# Patient Record
Sex: FEMALE | Race: WHITE | NOT HISPANIC OR LATINO | Employment: UNEMPLOYED | URBAN - METROPOLITAN AREA
[De-identification: names, ages, dates, MRNs, and addresses within clinical notes are randomized per-mention and may not be internally consistent; named-entity substitution may affect disease eponyms.]

---

## 2018-06-01 ENCOUNTER — TRANSFERRED RECORDS (OUTPATIENT)
Dept: PHYSICAL THERAPY | Facility: CLINIC | Age: 58
End: 2018-06-01

## 2018-06-12 ENCOUNTER — TRANSFERRED RECORDS (OUTPATIENT)
Dept: PHYSICAL THERAPY | Facility: CLINIC | Age: 58
End: 2018-06-12

## 2019-11-22 ENCOUNTER — TRANSFERRED RECORDS (OUTPATIENT)
Dept: PHYSICAL THERAPY | Facility: CLINIC | Age: 59
End: 2019-11-22

## 2021-02-15 ENCOUNTER — HOSPITAL ENCOUNTER (OUTPATIENT)
Dept: PHYSICAL THERAPY | Facility: CLINIC | Age: 61
Setting detail: THERAPIES SERIES
End: 2021-02-15
Attending: FAMILY MEDICINE
Payer: COMMERCIAL

## 2021-02-15 PROCEDURE — 97110 THERAPEUTIC EXERCISES: CPT | Mod: GP | Performed by: PHYSICAL THERAPIST

## 2021-02-15 PROCEDURE — 97161 PT EVAL LOW COMPLEX 20 MIN: CPT | Mod: GP | Performed by: PHYSICAL THERAPIST

## 2021-02-15 NOTE — PROGRESS NOTES
02/15/21 1000   Signing Clinician's Name / Credentials   Signing clinician's name / credentials Marlin Almanza, DPT   Quintero Balance Scale (QUINTERO LISA, ANUPAM S, GINO PHAN, BAUDILIO SYKES: MEASURING BALANCE IN THE ELDERLY: VALIDATION OF AN INSTRUMENT. CAN. J. PUB. HEALTH, JULY/AUGUST SUPPLEMENT 2:S7-11, 1992.)   Sit To Stand 4   Standing Unsupported 4   Sitting Unsupported 4   Stand to Sit 4   Transfers 4   Standing with Eyes Closed 4   Standing Unsupported, Feet Together 4   Reach Forward With Outstretched Arm 2   Retrieve Object From Floor 3  (Very wide RAJESH)   Turning to Look Behind 4   Turn 360 Degrees 2   Placing Alternate Foot on Stool (4-6 inches) 3   Unsupported Tandem Stand (Demonstrate to Subject) 2   One Leg Stand 2  (4 sec)   Total Score (A score of 45 or less has been correlated with an increased risk of falls)   Total Score (out of 56) 46     Quintero Balance Scale (BBS) Cutoff Scores: A score of < 45/56 indicates an increased risk for falls.   Patient Score: 46/56    The BBS is a measure of static and dynamic standing balance that has been validated in community dwelling elderly individuals and individuals who have Parkinson's Disease, MS, and those who are s/p CVA and TBI. The test is administered without an assistive device. Scores from the Qiuntero are used to determine the probability of falling based on the patient's previous history of falls and their test performance.     Minimal Detectable Change = 6.5   & Minimal Detectable Change (Parkinson's Disease) = 5 according to Lenny & Garrettey 2008    Assessment (rationale for performing, application to patient s function & care plan): Assess risk for falls, no increased risk per the Quintero but increased per the TUG and the 30 sec STS.   Minutes billed as physical performance test: 0

## 2021-02-15 NOTE — PROGRESS NOTES
Rehabilitation Services        OUTPATIENT PHYSICAL THERAPY FUNCTIONAL EVALUATION  PLAN OF TREATMENT FOR OUTPATIENT REHABILITATION  (COMPLETE FOR INITIAL CLAIMS ONLY)  Patient's Last Name, First Name, M.I.  YOB: 1960  NaderYamileth  ZAN     Provider's Name   Marlin Almanza PT   Medical Record No.  [id number]     Start of Care Date:  02/15/21   Onset Date:  01/18/21(Referral date used)   Type:     _X__PT   ____OT  ____SLP Medical Diagnosis:  Balance problems R26.89     PT Diagnosis:  Impaired balance and functional mobility Visits from SOC:  1                              __________________________________________________________________________________  Plan of Treatment/Functional Goals:  balance training, bed mobility training, gait training, neuromuscular re-education, orthotic fitting/training, ROM, strengthening, transfer training, stretching(Canalith repositioning maneuvers)           GOALS  HEP  Patient will demonstrate understanding and compliance to her HEP for continued wellbeing upon discharge from skilled physical therapy.  04/12/21    30 sec STS  Patient will complete 10 STS transfers with no UE assist from an 18 inch surface in 30 seconds to demonstrate improved LE strength for independent transfers from low surfaces.  04/12/21    Naranjo  Patient will complete the Naranjo with a score of >50/56 to demonstrate improved balance and decreased risk for falls with daily activities.  04/12/21    TUG  Patient will complete the TUG with and without dual physical challenge of carrying a half full glass of water in less than 13.5 seconds to demonstrate improved safety and independence with transfers, turns, and gait.  04/12/21                                                Therapy Frequency:  2 times/Week(Decreasing in frequency as indicated)   Predicted Duration of Therapy Intervention:  8 weeks    Marlin Almanza, PT,  DPT                                    I CERTIFY THE NEED FOR THESE SERVICES FURNISHED UNDER        THIS PLAN OF TREATMENT AND WHILE UNDER MY CARE .             Physician Signature               Date    X_____________________________________________________                      Certification Date From:  02/15/21   Certification Date To:  04/12/21    Referring Provider:  Janell Vines MD    Initial Assessment  See Epic Evaluation- Start of Care Date: 02/15/21

## 2021-02-15 NOTE — PROGRESS NOTES
02/15/21 1000   Quick Adds   Quick Adds Certification   Type of Visit Initial OP PT Evaluation   General Information   Start of Care Date 02/15/21   Referring Physician Janell Vines MD   Orders Evaluate and Treat as Indicated   Order Date 01/18/21   Medical Diagnosis Balance problems R26.89   Onset of illness/injury or Date of Surgery 01/18/21  (Referral date used)   Precautions/Limitations fall precautions   Surgical/Medical history reviewed Yes   Pertinent history of current problem (include personal factors and/or comorbidities that impact the POC) Patient has a PMH had reverse total right shoulder surgery in 8/2020, HTN, memory changes, falls, and leukopenia. Patient reports that she has had trouble with balance over the last year, but recently is having more falls. She is fallling 1-2x per week, sometimes with coming in from taking the dog out - slippery, she is also tripping over her own feet. She wears orthotics and needs some new ones, feels her right side is weaker, right hand dominant. Patient notes that she has dizziness after a fall, has to wait a couple of seconds for it to resolve before she can stand. Patient endorses dizziness with getting out of bed, has to sit for a couple of minutes to collect herself, feels like her right side is dragging. Patient endorses some feelings of room spinning sensation, nothing major.    Pertinent Visual History  Glasses   Prior level of function comment Patient was previously IND with all ADLs and functional mobility. Patient notes she has to be very cautious with walking, has to look at her feet with walking. 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 Yes   Have you fallen and had an injury in the past year? No   Timed Up and Go score (seconds) 13.97   Is patient a fall risk? Yes;Department fall risk interventions implemented   Pain   Patient currently in pain Yes   Pain location R shoulder   Pain rating 7/10   Pain description Sharp   Pain comments Worse with dependent positioning for a long time   Cognitive Status Examination   Cognitive Comment Recommend speech therapy evaluation   Integumentary   Integumentary No deficits were identified   Posture   Posture Forward head position   Strength   Strength Comments MMT performed in sitting, B LE grossly 4-/5   Bed Mobility   Bed Mobility Comments Notes onset of dizziness with getting out of bed   Transfer Skills   Transfer Comments Able to stand without UE assist but with left lateral lean    Gait   Gait Comments Patient ambulates with decreased gait speed, intermittently narrow and wide RAJESH with mild-moderate path deviation, occasional L LE scissoring, excessive left toe-in vs hip internal rotation, [place] gait in right stance, decreased right arm swing, downward gaze throughout   Balance Special Tests   Balance Special Tests Timed up and go;Sit to stand reps;Romberg;Naranjo balance   Balance Special Tests Timed Up and Go   Seconds 13.97 Seconds   Comments Cognitive serial 7s: 20.87 sec, Physical half cup of water: 17.53 sec, no AD throughout   Balance Special Tests Naranjo Balance   Score out of 56 46   Comments Impaired balance but no increased risk for falls   Balance Special Tests Romberg   Seconds 30 Seconds   Comments Eyes closed: 30 sec   Balance Special Tests Sit to Stand Reps in 30 Seconds   Reps in 30 seconds 6   Height 18 inches   Comments Impaired functional  LE strength, increased risk for falls, left lateral lean and poor right LE control with standing   Sensory Examination   Sensory Perception no deficits were identified   Coordination   Coordination no deficits were identified   Muscle Tone   Muscle Tone no deficits were identified   Modality Interventions   Planned Modality Interventions Comments Per therapist discretion   Planned Therapy Interventions   Planned Therapy Interventions balance training;bed mobility training;gait training;neuromuscular re-education;orthotic fitting/training;ROM;strengthening;transfer training;stretching  (Canalith repositioning maneuvers)   Clinical Impression   Criteria for Skilled Therapeutic Interventions Met yes, treatment indicated   PT Diagnosis Impaired balance and functional mobility   Influenced by the following impairments Impaired balance, dizziness, impaired cognition and memory, impaired strength, gait deviations, history of falls   Functional limitations due to impairments Increased risk for falls per the 30 sec STS and TUG, impaired safety and independence with transfers, bed mobility, ADLs, and functional mobility   Clinical Presentation Evolving/Changing   Clinical Presentation Rationale Dizziness, falls 1-2x per week   Clinical Decision Making (Complexity) Moderate complexity   Therapy Frequency 2 times/Week  (Decreasing in frequency as indicated)   Predicted Duration of Therapy Intervention (days/wks) 8 weeks   Risk & Benefits of therapy have been explained Yes   Patient, Family & other staff in agreement with plan of care Yes   Clinical Impression Comments Patient is a 60 year old female presenting to physical therapy with balance impairments and a history of falls. Patient is at an increased risk for falls per the 30 sec STS, Naranjo, and TUG with cognitive impairment noted. Patient requires frequent redirection and cues for attention to task during the session, also notes memory impairment and may benefit from a  speech therapy referral. Patient may benefit from skilled physical therapy to decrease her risk for falls and increase her safety and independence with functional mobility and ADLs.    GOALS   PT Eval Goals 1;2;3;4   Goal 1   Goal Identifier HEP   Goal Description Patient will demonstrate understanding and compliance to her HEP for continued wellbeing upon discharge from skilled physical therapy.   Target Date 04/12/21   Goal 2   Goal Identifier 30 sec STS   Goal Description Patient will complete 10 STS transfers with no UE assist from an 18 inch surface in 30 seconds to demonstrate improved LE strength for independent transfers from low surfaces.   Target Date 04/12/21   Goal 3   Goal Identifier Naranjo   Goal Description Patient will complete the Naranjo with a score of >50/56 to demonstrate improved balance and decreased risk for falls with daily activities.   Target Date 04/12/21   Goal 4   Goal Identifier TUG   Goal Description Patient will complete the TUG with and without dual physical challenge of carrying a half full glass of water in less than 13.5 seconds to demonstrate improved safety and independence with transfers, turns, and gait.   Target Date 04/12/21   Total Evaluation Time   PT Eval, Low Complexity Minutes (15842) 40   Therapy Certification   Certification date from 02/15/21   Certification date to 04/12/21   Medical Diagnosis Balance problems R26.89   Certification I certify the need for these services furnished under this plan of treatment and while under my care.  (Physician co-signature of this document indicates review and certification of the therapy plan).

## 2021-02-16 ENCOUNTER — MEDICAL CORRESPONDENCE (OUTPATIENT)
Dept: HEALTH INFORMATION MANAGEMENT | Facility: CLINIC | Age: 61
End: 2021-02-16

## 2021-03-02 ENCOUNTER — HOSPITAL ENCOUNTER (OUTPATIENT)
Dept: PHYSICAL THERAPY | Facility: CLINIC | Age: 61
Setting detail: THERAPIES SERIES
End: 2021-03-02
Attending: FAMILY MEDICINE
Payer: COMMERCIAL

## 2021-03-02 PROCEDURE — 97112 NEUROMUSCULAR REEDUCATION: CPT | Mod: GP | Performed by: PHYSICAL THERAPIST

## 2021-03-02 PROCEDURE — 97110 THERAPEUTIC EXERCISES: CPT | Mod: GP | Performed by: PHYSICAL THERAPIST

## 2021-03-04 ENCOUNTER — HOSPITAL ENCOUNTER (OUTPATIENT)
Dept: PHYSICAL THERAPY | Facility: CLINIC | Age: 61
Setting detail: THERAPIES SERIES
End: 2021-03-04
Attending: FAMILY MEDICINE
Payer: COMMERCIAL

## 2021-03-04 PROCEDURE — 97116 GAIT TRAINING THERAPY: CPT | Mod: GP | Performed by: PHYSICAL THERAPIST

## 2021-03-04 PROCEDURE — 97110 THERAPEUTIC EXERCISES: CPT | Mod: GP | Performed by: PHYSICAL THERAPIST

## 2021-03-08 ENCOUNTER — HOSPITAL ENCOUNTER (OUTPATIENT)
Dept: PHYSICAL THERAPY | Facility: CLINIC | Age: 61
Setting detail: THERAPIES SERIES
End: 2021-03-08
Attending: FAMILY MEDICINE
Payer: COMMERCIAL

## 2021-03-08 PROCEDURE — 97530 THERAPEUTIC ACTIVITIES: CPT | Mod: GP | Performed by: PHYSICAL THERAPIST

## 2021-03-08 PROCEDURE — 97112 NEUROMUSCULAR REEDUCATION: CPT | Mod: GP | Performed by: PHYSICAL THERAPIST

## 2021-03-08 PROCEDURE — 97116 GAIT TRAINING THERAPY: CPT | Mod: GP | Performed by: PHYSICAL THERAPIST

## 2021-03-12 ENCOUNTER — HOSPITAL ENCOUNTER (OUTPATIENT)
Dept: PHYSICAL THERAPY | Facility: CLINIC | Age: 61
Setting detail: THERAPIES SERIES
End: 2021-03-12
Attending: FAMILY MEDICINE
Payer: COMMERCIAL

## 2021-03-12 PROCEDURE — 97112 NEUROMUSCULAR REEDUCATION: CPT | Mod: GP | Performed by: PHYSICAL THERAPIST

## 2021-03-12 PROCEDURE — 97110 THERAPEUTIC EXERCISES: CPT | Mod: GP | Performed by: PHYSICAL THERAPIST

## 2021-03-15 ENCOUNTER — HOSPITAL ENCOUNTER (OUTPATIENT)
Dept: PHYSICAL THERAPY | Facility: CLINIC | Age: 61
Setting detail: THERAPIES SERIES
End: 2021-03-15
Attending: FAMILY MEDICINE
Payer: COMMERCIAL

## 2021-03-15 PROCEDURE — 97110 THERAPEUTIC EXERCISES: CPT | Mod: GP | Performed by: PHYSICAL THERAPIST

## 2021-03-15 PROCEDURE — 97112 NEUROMUSCULAR REEDUCATION: CPT | Mod: GP | Performed by: PHYSICAL THERAPIST

## 2021-03-15 NOTE — IP AVS SNAPSHOT
MRN:[id number]                      After Visit Summary   3/15/2021    Yamileth Doe    MRN: [id number]           Visit Information        Provider Department      3/15/2021 10:45 AM Marlin Almanza, PT [facility]        Your next 10 appointments already scheduled    Mar 22, 2021 10:15 AM  Neuro Treatment with Marlin Almanza PT  [facility] ([facility] ) [address] [facility] [address]  [phone]      Mar 26, 2021  8:15 AM  Neuro Eval with JESS Hernandez  [facility] ([facility] ) [address] [facility] [address]  [phone]      Mar 29, 2021 11:15 AM  Neuro Treatment with Marlin Almanza PT  [facility] ([facility] ) [address] [facility] [address]  [phone]      Apr 09, 2021  8:00 AM  Neuro Treatment with Marlin Almanza PT  [facility] ([facility] ) [address] [facility] [address]  [phone]      Apr 09, 2021  8:45 AM  Neuro Treatment with JESS Hernandez  [facility] ([facility] ) [address] [facility] [address]  [phone]      Apr 16, 2021  8:00 AM  Neuro Treatment with Marlin Almanza PT  [facility] ([facility] ) [address] [facility] [address]  [phone]      Apr 16, 2021  8:45 AM  Neuro Treatment with JESS Hernandez  [facility] ([facility] ) [address] [facility] [address]  [phone]      Apr 23, 2021  8:00 AM  Neuro Treatment with Marlin Almanza, CASANDRA  [facility] ([facility] )  [address] [facility] [address]  [phone]      Apr 23, 2021  8:45 AM  Neuro Treatment with Cheri White, SLP  [facility] ([facility] ) [address] [facility] [address]  [phone]           Further instructions from your care team       For the non slip tape strips:     Clean the steps and the bottom of the door frame with rubbing alcohol and allow to dry. On a day above 50 degrees, peel the backing off the strip and place on the floor or step. Press in the middle of the strip and work your way out to the side applying pressure all the way to the ends. Make sure edges are secure.     Care EveryWhere ID    This is your Care EveryWhere ID. This could be used by other organizations to access your [facility] medical records  [id number]       Equal Access to Services    DOM GARCIA : Hadii rachelle Oneal, waaxda luqadaha, qaybta kaalmada ademarianna, jordi santana . So [facility] [phone].    ATENCIÓN: Si habla español, tiene a keane disposición servicios gratuitos de asistencia lingüística. Alannah al [phone].    We comply with applicable federal and state civil rights laws, including the [place] Human Rights Act. We do not discriminate on the basis of race, color, creed, Latter day, national origin, marital status, age, disability, sex, sexual orientation, or gender identity.    If you would like an itemization of your charges they will now be available in [employer] 30 days after discharge. To access the itemized statements in [employer] go to billing/billing summary. From there select view account. There will be multiple tabs showing an overview of your account, detail, payments, and communications. From the communications tab you can see your monthly statements, your itemized statements, and any billing letters generated for your account. If you do not have a [employer] account and need help getting  access please contact [email or web address] at [phone].  If you would prefer to have your itemized statements mailed please contact our automated itemized bill request line at [phone] option  2.

## 2021-03-15 NOTE — DISCHARGE INSTRUCTIONS
For the non slip tape strips:     Clean the steps and the bottom of the door frame with rubbing alcohol and allow to dry. On a day above 50 degrees, peel the backing off the strip and place on the floor or step. Press in the middle of the strip and work your way out to the side applying pressure all the way to the ends. Make sure edges are secure.

## 2021-03-20 ENCOUNTER — MEDICAL CORRESPONDENCE (OUTPATIENT)
Dept: HEALTH INFORMATION MANAGEMENT | Facility: CLINIC | Age: 61
End: 2021-03-20

## 2021-03-20 ENCOUNTER — TRANSFERRED RECORDS (OUTPATIENT)
Dept: HEALTH INFORMATION MANAGEMENT | Facility: CLINIC | Age: 61
End: 2021-03-20

## 2021-03-26 ENCOUNTER — HOSPITAL ENCOUNTER (OUTPATIENT)
Dept: SPEECH THERAPY | Facility: CLINIC | Age: 61
Setting detail: THERAPIES SERIES
End: 2021-03-26
Attending: FAMILY MEDICINE
Payer: COMMERCIAL

## 2021-03-26 ENCOUNTER — MEDICAL CORRESPONDENCE (OUTPATIENT)
Dept: HEALTH INFORMATION MANAGEMENT | Facility: CLINIC | Age: 61
End: 2021-03-26

## 2021-03-26 PROCEDURE — 96125 COGNITIVE TEST BY HC PRO: CPT | Mod: GN | Performed by: SPEECH-LANGUAGE PATHOLOGIST

## 2021-03-26 NOTE — PROGRESS NOTES
Outpatient Speech and Language Evaluation   [facility]  03/26/21 0900       Present No   General Information   Type of Evaluation Cognitive-Linguistic   Type Of Visit Initial   Start Of Care Date 03/26/21   Referring Physician Dr. Janell Arellano    Orders Evaluate And Treat   Medical Diagnosis Balance Problems (R26.89) and Memory Imapirment (G31.84)    Onset Of Illness/injury Or Date Of Surgery 08/26/20   Precautions/Limitations  no known precautions/limitations   Hearing Pt reports WFL, but states that others tell her she has poor hearing   Surgical/Medical history reviewed Yes   Pertinent History Of Current Problem Yamileth Doe is a 60-year old female who was seen for a speech and language evaluation on 3/26/21 at the request of Dr. Janell Arellano due to concerns with memory impairments. Ms. 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 She is currently seeing physical therapy to address her fall concerns. She also reported that she has a history of depression and anxiety, for which she is taking medication (although she was unable to name the meds) and that she speaks to her psychiatrist every few months. She also reported occasional insomnia.     Memory concerns started around the time of her shoulder surgery (8/2020), resulting in mixing up or forgetting to take her medications, getting lost while driving, inability to follow directions/recipes and not being able to participate in a conversation. She reports that she often does not recall what her medications are for, or which ones to take each day. Ms. Doe reported that she went to Urgent care on Thursday 3/18/21 due to her throat  getting worse  (cough and raw) tightness in her chest and being SOB. She reported that she tested negative for COVID-19 and they believe it was just a virus. She also complained of a burning  "sensation on the R side of tongue that is now moving over to the left side. It is constant, it was managed with cough drops but now they burn too so she reported that she is drinking a lot of water to manage the pain.     Ms. Doe reports that she had a MRI in 1/13/21 which found Several small, nonspecific, T2 signal hyperintensities are identified the supratentorial white matter, likely representing chronic microvascular infarcts. These have increased minimally in number as compared to the exam of 6/1/2018   Prior Level Of Function Comment Ms. Doe stated that she was independent with her daily routines prior to her surgery. 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 I was unable to determine if she is hoping to return to work, or if she is retired, as she was not able to fully answer questions about her work history. She reported that she lives alone with a dog and cat, but does not have any family support.    Patient Role/employment History Other/comments  (pt unable to clarify if she is retired or unemployed)   General Observations Ms. Doe is a very pleasant 60-year old female who participated in the evaluation to the best of her ability. She struggled to maintain a topic of conversation and follow directions. She was often unable remember why she was here and would need multiple redirections to return to the conversation.    Patient/family Goals \"to find out what is going on\"   Fall Risk Screen   Fall screen comments PT completed at evaluation. Patient is currently being seen by PT   Abuse Screen (yes response referral indicated)   Feels Unsafe at Home or Work/School no   Feels Threatened by Someone no   Does Anyone Try to Keep You From Having Contact with Others or Doing Things Outside Your Home? no   Physical Signs of Abuse Present no   Oral Motor Sensory Function   Comments WFL   Speech   Speech Comments Speech is clear in conversation   Pragmatics (the social or " functional use of a language)   Deficits noted in Nonverbal None   Deficits noted in Conversational Skills Turn-taking;Verbosity;Circumlocutions   Deficits noted in the Use of Linguistic Context Topic Maintenance;Turn-taking;Referencing Skills   Deficits noted in the Organization of Narrative; RICE Disorganized;Completeness   Cognitive Status Examination   Attention impaired   Behavioral Observations alert;confused;distractible   Visual Impairments Include visual acuity;visual scanning;visual tracking   Short Term Memory impaired   Long Term Memory impaired   Reasoning impaired   Executive Function Deficits Noted impulsivity;insight/awareness;mental flexibility;self-correction;organization   Standardized cognitive-linguistic assessment completed RBANS - see 2nd note for details   Cognitive Status Exam Comments Ms. Doe presents with a severe cognitive linguistic impairment characterized by deficits in mild deficits in language and attention, and severe impairments in immediate memory, visuospatial/constructional skills, and delayed memory.     Skilled speech therapy is warranted to improve her memory in order to complete ADLs across all environments.    Education Assessment   Barriers to Learning Cognitive   Preferred Learning Style Listening;Demonstration   General Therapy Interventions   Planned Therapy Interventions Cognitive Treatment   Cognitive treatment Internal memory strategy training;External memory strategy training;Progressive attention training   Clinical Impression, SLP Eval   Criteria for Skilled Therapeutic Interventions Met (SLP Eval) yes;treatment indicated   SLP Diagnosis severe cognitive linguistic impairments   Rehab potential affected by complex medical history including depression, insonmina, multiple medications   Therapy Frequency 2 times;per week   Predicted Duration of Therapy Intervention (days/wks) 8 weeks   Risks and Benefits of Treatment have been explained. Yes   Patient, Family &  other staff in agreement with plan of care Yes   Clinical Impression Comments Ms. Doe is a 60 year old female who presents with a severe cognitive linguistic impairment.     Therapy is warranted to maximize cognitive skills across settings.    Cognitive/Communication Goals   Cognitive/Communication Goals 2;1;3;4;5   Cognitive/Communication Goal 1   Goal Identifier 1. immediate memory   Goal Description Ms. Doe will learn and utilize 3 memory strategies to complete immediate memory tasks (recall of list of words, numbers, ect) with 80% accuracy, given min-mod cues in order to be able to participate in her ADLs at home   Target Date 06/23/21   Cognitive/Communication Goal 2   Goal Identifier 2. Memory   Goal Description Ms. Doe will demonstrate 80% accuracy for auditory comprehension tasks (i.e. following 1-2 step directions, listening to a paragraph and answering questions) after a brief 1-3 minute delay provided min/mod cues in order to respond appropriately when asked a question    Target Date 06/23/21   Cognitive/Communication Goal 3   Goal Identifier 3. Reading   Goal Description Ms. Doe will complete a basic level reading task with 80% accuracy (i.e. reading 1 step direction) provided min/mod cues in order to understand written directions   Target Date 06/23/21   Cognitive/Communication Goal 4   Goal Identifier 4. attention   Goal Description Ms. Doe will complete a simple alternating attention task with no distracter present with 80% accuracy when given min/mod cues in order to manage daily activities   Target Date 06/23/21   Cognitive/Communication Goal 5   Goal Identifier 5. Home program   Goal Description Ms. Doe will demonstrate completion of home program activities 4/5 opportunities without cues in order to improve her ability to complete ADLs.    Total Session Time   Total Evaluation Time 60   Therapy Certification   Certification date from 03/26/21   Certification date to 06/23/21    Medical Diagnosis Memory Impairment G31.84   Certification I certify the need for these services furnished under this plan of treatment and while under my care.  (Physician co-signature of this document indicates review and certification of the therapy plan).       Please see 2nd note for RBANS assessment details.    Thank you for this referral.     Cheri White MA CCC-SLP  Speech Language Pathologist  [facility] Work [facility]

## 2021-03-26 NOTE — PROGRESS NOTES
Repeatable Battery for the Assessment of Neuropsychological Status Update   (RBANS  Update)  The Repeatable Battery for the Assessment of Neuropsychological Status Update (RBANS  Update) was administered to Yamileth Doe on 3/26/2021.  The RBANS Update was originally developed with a primary focus on assessment of dementia, and measures cognitive decline or improvement across these domains: immediate memory, visuospatial/constructional; language; attention; and delayed memory.  However, special group studies are available for Alzheimer's Disease, Vascular Dementia, HIV Dementia, Schley's Disease, Parkinson's Disease, Depression, Schizophrenia, and Closed Head Injury.   The RBANS can be used by clinicians and neuropsychologists to help screen for deficits in acute-care settings; track recovery during rehabilitation; track progression of neurological disorders; and screen for neurocognitive status in adolescents.  This test is normed for ages 12-89.  The subtest normative data are presented in scaled score units that have a mean of 10 and a standard deviation of 3.          Total Score Scaled Score  Percentile Group       I.  Immediate memory    1.  List Learning   12  2   2.  Story Memory   10  4  Immediate Memory Index Score  = 57    II.  Visuospatial/Constructional    3.  Figure copy   13  3  4.  Line Orientation   11     3-9  Visuospatial/Constructional Index Score  = 64    III.  Language     5.  Picture Naming   10     51-75  6.  Semantic Fluency   11  3  Language Index Score = 82    IV.  Attention  7.  Digit Span     11  11  8.  Coding     21  3  Attention Index Score = 82    V.  Delayed Memory  9.  List recall    2     3-9  10. List Recognition   18     10-16  11. Story Recall   1  2  12. Figure Recall   6  5  Delayed Memory Index Score = 64  Sum of Total Scores for Subtest 9+11+12 9    Sum of Index Scores = 349  Total Scale Score = 62      INTERPRETATION OF TEST RESULTS: Severe cognitive linguist  impairments, specifically in the areas of immediate memory, visuospatial/constructional skills, and delayed memory. Speech therapy is warranted to address these concerns.     TIME ADMINISTERING TEST: 45  TIME FOR INTERPRETATION AND PREPARATION OF REPORT: 30  TOTAL TIME: 75    Repeatable Battery for the Assessment of Neuropsychological Status Update (RBANS Update). Copyright   2012 [facility] Adapted and reproduced with permission. All rights reserved.

## 2021-03-26 NOTE — PROGRESS NOTES
[unfilled]                                                                          [facility]          OUTPATIENT SPEECH LANGUAGE PATHOLOGY LANGUAGE-COGNITION  EVALUATION  PLAN OF TREATMENT FOR OUTPATIENT REHABILITATION  (COMPLETE FOR INITIAL CLAIMS ONLY)  Patient's Last Name, First Name, M.I.  YOB: 1960  Yamileth Doe                        Provider s Name: [facility] Medical Record No.  [id number]     Onset Date:  08/26/20   Start of Care Date: 03/26/21   Type:     ___PT  __OT   _X_SLP    Medical Diagnosis:  Memory Impairment G31.84   Speech Language Pathology Diagnosis:  severe cognitive linguistic impairments    Visits from SOC: 1                                        ________________________________________________________________________________  Plan of Treatment/Functional Goals:   Planned Therapy Interventions: Cognitive Treatment        Communication Goals   1. Goal Identifier: 1. immediate memory       Goal Description: Ms. Doe will learn and utilize 3 memory strategies to complete immediate memory tasks (recall of list of words, numbers, ect) with 80% accuracy, given min-mod cues in order to be able to participate in her ADLs at home       Target Date: 06/23/21   2. Goal Identifier: 2. Memory       Goal Description: Ms. Doe will demonstrate 80% accuracy for auditory comprehension tasks (i.e. following 1-2 step directions, listening to a paragraph and answering questions) after a brief 1-3 minute delay provided min/mod cues in order to respond appropriately when asked a question        Target Date: 06/23/21   3. Goal Identifier: 3. Reading       Goal Description: Ms. Doe will complete a basic level reading task with 80% accuracy (i.e. reading 1 step direction) provided min/mod cues in order to understand written directions       Target Date: 06/23/21   4. Goal Identifier: 4. attention       Goal Description: Ms.  Nader will complete a simple alternating attention task with no distracter present with 80% accuracy when given min/mod cues in order to manage daily activities       Target Date: 06/23/21   5. Goal Identifier: 5. Home program       Goal Description: Ms. Doe will demonstrate completion of home program activities 4/5 opportunities without cues in order to improve her ability to complete ADLs.                                Predicted Duration of Therapy Intervention (days/wks): 8 weeks    Cheri White, SLP       I CERTIFY THE NEED FOR THESE SERVICES FURNISHED UNDER        THIS PLAN OF TREATMENT AND WHILE UNDER MY CARE .             Physician Signature               Date    X_____________________________________________________                        Certification Date From:  03/26/21  Certification Date To:   06/23/21          Referring Physician:  Dr. Janell Arellano     Initial Assessment        See Epic Evaluation Start Of Care Date: 03/26/21

## 2021-03-31 ENCOUNTER — HOSPITAL ENCOUNTER (OUTPATIENT)
Dept: SPEECH THERAPY | Facility: CLINIC | Age: 61
Setting detail: THERAPIES SERIES
End: 2021-03-31
Attending: FAMILY MEDICINE
Payer: COMMERCIAL

## 2021-03-31 PROCEDURE — 97129 THER IVNTJ 1ST 15 MIN: CPT | Performed by: SPEECH-LANGUAGE PATHOLOGIST

## 2021-03-31 PROCEDURE — 97130 THER IVNTJ EA ADDL 15 MIN: CPT | Mod: GN | Performed by: SPEECH-LANGUAGE PATHOLOGIST

## 2021-04-02 ENCOUNTER — HOSPITAL ENCOUNTER (OUTPATIENT)
Dept: SPEECH THERAPY | Facility: CLINIC | Age: 61
Setting detail: THERAPIES SERIES
End: 2021-04-02
Attending: FAMILY MEDICINE
Payer: COMMERCIAL

## 2021-04-02 PROCEDURE — 97129 THER IVNTJ 1ST 15 MIN: CPT | Mod: GN | Performed by: SPEECH-LANGUAGE PATHOLOGIST

## 2021-04-02 PROCEDURE — 97130 THER IVNTJ EA ADDL 15 MIN: CPT | Performed by: SPEECH-LANGUAGE PATHOLOGIST

## 2021-04-05 ENCOUNTER — HOSPITAL ENCOUNTER (OUTPATIENT)
Dept: SPEECH THERAPY | Facility: CLINIC | Age: 61
Setting detail: THERAPIES SERIES
End: 2021-04-05
Attending: FAMILY MEDICINE
Payer: COMMERCIAL

## 2021-04-05 PROCEDURE — 97129 THER IVNTJ 1ST 15 MIN: CPT | Performed by: SPEECH-LANGUAGE PATHOLOGIST

## 2021-04-05 PROCEDURE — 97130 THER IVNTJ EA ADDL 15 MIN: CPT | Performed by: SPEECH-LANGUAGE PATHOLOGIST

## 2021-04-05 NOTE — PROGRESS NOTES
Dr. Arellano,    Below is my note after seeing Yamileth for a speech therapy session this morning. I am co-treating her with another speech therapist, Cheri Gonzalez. I had concerns today about Yamileth's difficulty with therapy tasks. Despite max cues and models, Yamileth was not able to effectively use memory strategies for basic memory tasks. Given her difficulty with this I had a conversation with the other therapist treating her and that's when she shared about Yamileth reporting missing meals and difficulty caring for her pets. Yamileth is scheduled for a CPT evaluation with OT on 4/27. However, that is still about 3 weeks away, I was wondering if there are any social work services through your clinic that might be able to help Yamileth? If you have questions for me, my direct number is [phone].    Cheri Mantilla MA, CCC-SLP  [facility]  [phone] (Phone)  [phone] (Fax)     04/05/21 0800   Signing Clinician's Name / Credentials   Signing clinician's name /credentials Cheri Mantilla MA, CCC-SLP   Session Number   Session Number 4/4   Authorization status [facility] - certs required       Present No   Progress/Recertification   Recertification Due 06/23/21   Subjective Report   Subjective Report Yamileth completed home exercises from her session on 4/2. However, when this was reviewed, about 50% of completed items had mistakes.   Cognitive/Communication Goals   Cognitive/Communication Goals 1;2;3;4;5   Cognitive/Communication Goal 1   Goal Identifier 1. immediate memory   Goal Description Ms. Doe will learn and utilize 3 memory strategies to complete immediate memory tasks (recall of list of words, numbers, ect) with 80% accuracy, given min-mod cues in order to be able to participate in her ADLs at home   Target Date 06/23/21   Cognitive/Communication Goal 2   Goal Identifier 2. Memory   Goal Description Ms. Doe will demonstrate 80% accuracy  for auditory comprehension tasks (i.e. following 1-2 step directions, listening to a paragraph and answering questions) after a brief 1-3 minute delay provided min/mod cues in order to respond appropriately when asked a question    Target Date 06/23/21   Cognitive/Communication Goal 3   Goal Identifier 3. Reading   Goal Description Ms. Doe will complete a basic level reading task with 80% accuracy (i.e. reading 1 step direction) provided min/mod cues in order to understand written directions   Target Date 06/23/21   Cognitive/Communication Goal 4   Goal Identifier 4. attention   Goal Description Ms. Doe will complete a simple alternating attention task with no distracter present with 80% accuracy when given min/mod cues in order to manage daily activities   Target Date 06/23/21   Cognitive/Communication Goal 5   Goal Identifier 5. Home program   Goal Description Ms. Doe will demonstrate completion of home program activities 4/5 opportunities without cues in order to improve her ability to complete ADLs. 4. Completing an eazy maze while talking about her pets 1/4    Target Date 06/23/21   Treatment Interventions    Treatment Interventions Treatment Cognitive Skill   Treatment Cognitive Skill   Cognitive Skills Intervention: memory, attention, problem solving 1st 15 minutes Timed ([id number]) 45 Minutes   Skilled Intervention Internal memory strategy training   Treatment Detail 1. Yamileth participated in memory tasks including a list of 4 words, mod to max support for using a strategy of associations, and then asked to recall with cued questions (i.e. Which one do you put on food?) Despite being provided moderate to max support with these tasks, Yamileth showed 50% accuracy and the remaining answers had to be told to her. A list learning task was also attempted with Yamileth provided support to identify categories for items. She required moderate support to identify categories. Then, during recall, when asked to name  "items or categories, she named words from the previous task such as saying \"Monday\" when asked, \"What category does shampoo go in?\" 2. & 3. DNT. 4. Yamileth particiated in basic, sustaed attention tasks. First she was read single digit number and asked to tap her hand when she heard the number 2. She accurately tapped on 8/9 opportunities and had no false positives. She was then given a moderately complex task of tapping when she heard a number that was one less than the previous number. She required moderate repetitions and models, but with these eventually showed 80% accuracy in these tasks. 5. DNT.   Progress Significant difficulty with mild to moderate memory tasks despite max support and use of strategies.   Communication with other professionals   Communication with other professionals Daily note with concerns for self care to be sent to referring provider. Consider social work intervention.   Plan   Homework Item recall with descriptions provided.   Updates to plan of care None.   Plan for next session Basic functional memory and attention tasks with strategies.   Comments   Comments Discussed Yamileth with Cheri White, SLP who also works with Yamileth. In her appointment with Yamileth on 4/2, Cheri reports Yamileth is forgetting to eat such as having a cup of coffee in the morning and a bowl of cereal in the afternoon only. She also reported to Cheri that she is having trouble caring for her animals including forgetting to feed them and unintentionally leaving them in their kennel. An order for an OT CPT evaluation has been scheduled and Yamileth will be seen on 4/27.   Total Session Time   Total Treatment Time (sum of timed and untimed services) 45     "

## 2021-04-09 ENCOUNTER — HOSPITAL ENCOUNTER (OUTPATIENT)
Dept: SPEECH THERAPY | Facility: CLINIC | Age: 61
Setting detail: THERAPIES SERIES
End: 2021-04-09
Attending: FAMILY MEDICINE
Payer: COMMERCIAL

## 2021-04-09 ENCOUNTER — HOSPITAL ENCOUNTER (OUTPATIENT)
Dept: PHYSICAL THERAPY | Facility: CLINIC | Age: 61
Setting detail: THERAPIES SERIES
End: 2021-04-09
Attending: FAMILY MEDICINE
Payer: COMMERCIAL

## 2021-04-09 PROCEDURE — 97110 THERAPEUTIC EXERCISES: CPT | Mod: GP | Performed by: PHYSICAL THERAPIST

## 2021-04-09 PROCEDURE — 97750 PHYSICAL PERFORMANCE TEST: CPT | Mod: GP | Performed by: PHYSICAL THERAPIST

## 2021-04-09 PROCEDURE — 97129 THER IVNTJ 1ST 15 MIN: CPT | Mod: GN | Performed by: SPEECH-LANGUAGE PATHOLOGIST

## 2021-04-09 PROCEDURE — 97130 THER IVNTJ EA ADDL 15 MIN: CPT | Mod: GN | Performed by: SPEECH-LANGUAGE PATHOLOGIST

## 2021-04-09 PROCEDURE — 97530 THERAPEUTIC ACTIVITIES: CPT | Mod: GP | Performed by: PHYSICAL THERAPIST
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
## 2021-04-09 NOTE — PROGRESS NOTES
Rehabilitation Services      OUTPATIENT PHYSICAL THERAPY  PLAN OF TREATMENT FOR OUTPATIENT REHABILITATION    Patient's Last Name, First Name, M.I.                YOB: 1960  NaderYamileth  A                        Provider's Name  Marlin Almanza, PT Medical Record No.  [id number]                               Onset Date: 01/18/21(Referral date used)   Start of Care Date: 02/15/21   Type:     _X_PT   ___OT   ___SLP Medical Diagnosis: Balance problems R26.89                       PT Diagnosis: Impaired balance and functional mobility      _________________________________________________________________________________  Plan of Treatment:    Frequency/Duration: 1x per week for 90 days, decreasing in frequency as indicated     Goals:  Goal Identifier HEP   Goal Description Patient will demonstrate understanding and compliance to her HEP for continued wellbeing upon discharge from skilled physical therapy.   Target Date 04/12/21   Date Met      Progress: patient currently not compliant to her HEP, reissued today     Goal Identifier 30 sec STS   Goal Description Patient will complete 10 STS transfers with no UE assist from an 18 inch surface in 30 seconds to demonstrate improved LE strength for independent transfers from low surfaces.   Target Date 04/12/21   Date Met      Progress: Patient demonstrates overall improved functional LE strength by ability to complete 8-9 reps vs 6 initially, remains appropriate     Goal Identifier Naranjo   Goal Description Patient will complete the Naranjo with a score of >50/56 to demonstrate improved balance and decreased risk for falls with daily activities.   Target Date 04/12/21   Date Met      Progress: Progressing toward this goal, balance improved from 46/56 to 49/56 this visit, remains appropriate     Goal Identifier TUG   Goal Description Patient will complete the TUG with and without dual  physical challenge of carrying a half full glass of water in less than 13.5 seconds to demonstrate improved safety and independence with transfers, turns, and gait.   Target Date 04/12/21   Date Met      Progress: Minimal progress made toward this goal since last session, she continues to demonstrate significant challenge with dual cognitive or physical task     Progress Toward Goals:   Progress this reporting period: Patient is progressing with balance and notes that she is falling less at home. She has installed some non-slip tape in frequent-fall areas at home which has been helping but she also notes improved balance. Patient also demonstrates improved functional LE strength but she continues to be at an increased risk for falls. She may benefit from continued skilled physical therapy to address these remaining deficits.     Goal dates updated.     Certification date from 4/9/21 to 7/7/2021.    Marlin Almanza, PT, DPT          I CERTIFY THE NEED FOR THESE SERVICES FURNISHED UNDER        THIS PLAN OF TREATMENT AND WHILE UNDER MY CARE .             Physician Signature               Date    X_____________________________________________________                      Referring Provider: Janell Vines MD

## 2021-04-09 NOTE — DISCHARGE INSTRUCTIONS
To work on dropping things:     Squeeze a soft ball or rolled up towel in each hand holding for 5-10 seconds, repeat 10-15 times. You can also do this in the shower or with your hands in a bucket/sink of warm water to help with arthritis pain and stiffness.    Practice opening and closing a jar only as tight as you can get it open again without using a towel, repeat 10 times per day.    When you reach for your drink or anything else, look at it first, then reach for it.

## 2021-04-09 NOTE — IP AVS SNAPSHOT
After Visit Summary Template Not Found    This Print Group is only intended to be used in the After Visit Summary and can only be used in a report that uses a released After Visit Summary Template.                       MRN:[id number]                      After Visit Summary   4/9/2021    Yamileth Doe    MRN: [id number]           Visit Information        Provider Department      4/9/2021  8:00 AM Marlin Almanza, PT [facility]        Your next 10 appointments already scheduled    Apr 09, 2021  8:45 AM  Neuro Treatment with JESS Hernandez  [facility] ([facility] ) [address] [facility] [address]  [phone]      Apr 14, 2021  1:45 PM  Neuro Treatment with JESS Red  [facility] ([facility] ) [address] [facility] [address]  [phone]      Apr 16, 2021  8:00 AM  Neuro Treatment with Marlin Almanza PT  [facility] ([facility] ) [address] [facility] [address]  [phone]      Apr 16, 2021  8:45 AM  Neuro Treatment with JESS Hernandez  [facility] ([facility] ) [address] [facility] [address]  [phone]      Apr 21, 2021  1:45 PM  Neuro Treatment with JESS Red  [facility] ([facility] ) [address] [facility] [address]  [phone]      Apr 23, 2021  8:00 AM  Neuro Treatment with Barbie Sandoval PT  [facility] ([facility] ) [address] [facility] [address]  [phone]      Apr 23, 2021  8:45 AM  Neuro Treatment with JESS Hernandez  [facility] [address]  [facility] [address]  [phone]      Apr 27, 2021  9:15 AM  Neuro Eval with Aixa Wong OT  [facility] ([facility] ) [address] [facility] [address]  [phone]      Apr 28, 2021  1:45 PM  Neuro Treatment with JESS Red  [facility] ([facility] ) [address] [facility] [address]  [phone]           Further instructions from your care team       To work on dropping things:     Squeeze a soft ball or rolled up towel in each hand holding for 5-10 seconds, repeat 10-15 times. You can also do this in the shower or with your hands in a bucket/sink of warm water to help with arthritis pain and stiffness.    Practice opening and closing a jar only as tight as you can get it open again without using a towel, repeat 10 times per day.    When you reach for your drink or anything else, look at it first, then reach for it.     Care EveryWhere ID    This is your Care EveryWhere ID. This could be used by other organizations to access your [place] medical records  [id number]       Equal Access to Services    DOM GARCIA AH: Hadtimothy dobbinso Sojosh, waaxda luqadaha, qaybta kaalmada adeegyamunir, jordi pascual. So [facility] [phone].    ATENCIÓN: Si habla español, tiene a keane disposición servicios gratuitos de asistencia lingüística. Llame al [phone].    We comply with applicable federal and state civil rights laws, including the [place] Human Rights Act. We do not discriminate on the basis of race, color, creed, Lutheran, national origin, marital status, age, disability, sex, sexual orientation, or gender identity.    If you would like an itemization of your charges they will now be available in [employer] 30 days after discharge. To access the itemized statements in [employer] go to billing/billing summary. From  there select view account. There will be multiple tabs showing an overview of your account, detail, payments, and communications. From the communications tab you can see your monthly statements, your itemized statements, and any billing letters generated for your account. If you do not have a [employer] account and need help getting access please contact [employer] support at [phone].  If you would prefer to have your itemized statements mailed please contact our automated itemized bill request line at [phone] option  2.

## 2021-04-09 NOTE — PROGRESS NOTES
04/09/21 0800   Signing Clinician's Name / Credentials   Signing clinician's name / credentials Marlin Almanza, DPT   Naranjo Balance Scale (LEON GONZALEZ, ANUPAM IWRIN, GINO PHAN, BAUDILIO SYKES: MEASURING BALANCE IN THE ELDERLY: VALIDATION OF AN INSTRUMENT. CAN. J. PUB. HEALTH, JULY/AUGUST SUPPLEMENT 2:S7-11, 1992.)   Sit To Stand 4   Standing Unsupported 4   Sitting Unsupported 4   Stand to Sit 4   Transfers 4   Standing with Eyes Closed 4   Standing Unsupported, Feet Together 4   Reach Forward With Outstretched Arm 3   Retrieve Object From Floor 4   Turning to Look Behind 3  (Pain in neck with left rotation)   Turn 360 Degrees 3   Placing Alternate Foot on Stool (4-6 inches) 4   Unsupported Tandem Stand (Demonstrate to Subject) 3   One Leg Stand 1   Total Score (A score of 45 or less has been correlated with an increased risk of falls)   Total Score (out of 56) 49     Naranjo Balance Scale (BBS) Cutoff Scores: A score of < 45/56 indicates an increased risk for falls.   Patient Score: 49/56    The BBS is a measure of static and dynamic standing balance that has been validated in community dwelling elderly individuals and individuals who have Parkinson's Disease, MS, and those who are s/p CVA and TBI. The test is administered without an assistive device. Scores from the Naranjo are used to determine the probability of falling based on the patient's previous history of falls and their test performance.     Minimal Detectable Change = 6.5   & Minimal Detectable Change (Parkinson's Disease) = 5 according to Lenny & Garrettey 2008    Assessment (rationale for performing, application to patient s function & care plan): Naranjo performed to assess balance, fall risk, and progress toward her goals. Patient demonstrates improved balance compared to last session with 49/56 today vs 46/56 initially. Patient is at no increased risk for falls per the Naranjo and demonstrates improved balance, progressing toward her goals. However she continues to  "report falls and may benefit from continued skilled physical therapy to address these concerns and remaining deficits.   Minutes billed as physical performance test: 10    30-Second Sit to Stand Test:  The test is designed to be conducted with a straight back chair, without armrests, with a 17-inch seat height.  (Chair heights: High back & Folding = 18\", ICU/5C recliner & window seat = 18 1/2\"  Actual height of chair used: 18 \"    Patient Score (score =0 if must use arms) 8 reps    The 30 Second Sit to Stand Test is considered a test of fall risk.  Data from [place] HARVINDER, cosponsored by [place] Dept of Health:  If must use arms = High Fall Risk regardless of reps  8 or less times = High Fall Risk   9 to 12 times = Moderate Risk  13 or more times = Low Risk    The 30 Second Sit to Stand Test is also considered a test of leg strength and endurance.   Normative Data from Casey et al,. 2001  Age                 Reps: Men        Reps: Women  60-64                14-19                       12-17                               65-69                12-18                       11-16                    70-74                12-17                       10-15              75-79                11-17                       10-15                    80-84                10-15                         9-14  85-89                 8-14                          8-13  90-94                 7-12                          4-11    Assessment (rationale for performing, application to patient s function & care plan): 30 sec STS performed to assess functional LE strength, risk for falls, and progress toward her goals. Patient demonstrates overall improved LE strength by ability to complete 8 reps this visit vs 6 at the initial evaluation, however she was able to perform 9 reps last session. Patient is at an increased risk per this assessment and may benefit from skilled physical therapy to address these remaining deficits.   (Physical Therapist: Minutes " billed as physical performance): 4

## 2021-04-16 ENCOUNTER — HOSPITAL ENCOUNTER (OUTPATIENT)
Dept: SPEECH THERAPY | Facility: CLINIC | Age: 61
Setting detail: THERAPIES SERIES
End: 2021-04-16
Attending: FAMILY MEDICINE
Payer: COMMERCIAL

## 2021-04-16 ENCOUNTER — HOSPITAL ENCOUNTER (OUTPATIENT)
Dept: PHYSICAL THERAPY | Facility: CLINIC | Age: 61
Setting detail: THERAPIES SERIES
End: 2021-04-16
Attending: FAMILY MEDICINE
Payer: COMMERCIAL

## 2021-04-16 PROCEDURE — 97112 NEUROMUSCULAR REEDUCATION: CPT | Mod: GP | Performed by: PHYSICAL THERAPIST

## 2021-04-16 PROCEDURE — 97129 THER IVNTJ 1ST 15 MIN: CPT | Performed by: SPEECH-LANGUAGE PATHOLOGIST

## 2021-04-16 PROCEDURE — 97130 THER IVNTJ EA ADDL 15 MIN: CPT | Mod: GN | Performed by: SPEECH-LANGUAGE PATHOLOGIST

## 2021-04-21 ENCOUNTER — HOSPITAL ENCOUNTER (OUTPATIENT)
Dept: SPEECH THERAPY | Facility: CLINIC | Age: 61
Setting detail: THERAPIES SERIES
End: 2021-04-21
Attending: FAMILY MEDICINE
Payer: COMMERCIAL

## 2021-04-21 PROCEDURE — 97130 THER IVNTJ EA ADDL 15 MIN: CPT | Mod: GN | Performed by: SPEECH-LANGUAGE PATHOLOGIST

## 2021-04-21 PROCEDURE — 97129 THER IVNTJ 1ST 15 MIN: CPT | Performed by: SPEECH-LANGUAGE PATHOLOGIST

## 2021-04-27 ENCOUNTER — HOSPITAL ENCOUNTER (OUTPATIENT)
Dept: OCCUPATIONAL THERAPY | Facility: CLINIC | Age: 61
Setting detail: THERAPIES SERIES
End: 2021-04-27
Attending: FAMILY MEDICINE
Payer: COMMERCIAL

## 2021-04-27 PROCEDURE — 97535 SELF CARE MNGMENT TRAINING: CPT | Mod: GO | Performed by: OCCUPATIONAL THERAPIST

## 2021-04-27 PROCEDURE — 96125 COGNITIVE TEST BY HC PRO: CPT | Mod: GO | Performed by: OCCUPATIONAL THERAPIST

## 2021-04-27 PROCEDURE — 97165 OT EVAL LOW COMPLEX 30 MIN: CPT | Mod: GO | Performed by: OCCUPATIONAL THERAPIST

## 2021-04-27 NOTE — PROGRESS NOTES
04/27/21 1100   Quick Adds   Quick Adds Certification   Type of Visit Initial Outpatient Occupational Therapy Evaluation       Present No   General Information   Start Of Care Date 04/27/21   Referring Physician Janell Arellano MD   Orders Evaluate and treat as indicated   Other Orders CPT   Orders Date 03/26/21   Medical Diagnosis Memory Impairment   Surgical/Medical History Reviewed Yes   Additional Occupational Profile Info/Pertinent History of Current Problem Discussed information with SLP as pt is from a healthcare provider outside of [facility].    Comments/Observations Arrived on time.   Role/Living Environment   Current Community Support   (pt lives alone, not close w brother or sister, min friends)   Patient role/Employment history Disabled   Community/Avocational Activities read   Current Living Environment House   Home/Community Accessibility Comments Pt has had mutliple falls due to dizziness.   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    Role/Living Environment Comments Pt reported having a dog and a cat.  Pt reported multiple falls.  Pt reported oven not working but her stove and microwave do work.  Pt reported not receiving as much money from [employer] due to inability to locate her W2 forms from 2019 and 2020.    Patient/family Goals Statement To learn about score of CPT and what it means for her.   Pain   Patient currently in pain No   Fall Risk Screen   Fall screen completed by OT   Is patient a fall risk? Yes   Fall screen comments Pt reported multiple falls. Pt has been seen by PT.   Abuse Screen (yes response referral indicated)   Feels Unsafe at Home or Work/School no   Feels Threatened by Someone no   Does Anyone Try to Keep  You From Having Contact with Others or Doing Things Outside Your Home? no   Physical Signs of Abuse Present no   Cognitive Status Examination   Orientation Orientation to person, place and time   Cognitive Comment Pt scored 4.0/5.5 on CPT.  See Progress Note for details.    Visual Perception   Visual Perception Wears glasses   Visual Perception Comments Noticed left pupil is more dilated versus right.    Posture   Posture Forward head position   Hand Strength   Hand Dominance Right   Functional Mobility   Ambulation IND, slight limp due to orthotics that aren't very comfortable per pt.    Bathing   Level of [place] - Bathing independent   Upper Body Dressing   Level of [place]: Dress Upper Body independent   Lower Body Dressing   Level of [place]: Dress Lower Body independent   Toileting   Level of [place]: Toilet independent   Grooming   Level of [place]: Grooming independent   Eating/Self-Feeding   Level of [place]: Eating independent   Activity Tolerance   Activity Tolerance fair   Planned Therapy Interventions   Planned Therapy Interventions ADL training;IADL training;Cognitive performance testing;Self care/Home management;Therapeutic activities   Adult OT Eval Goals   OT Eval Goals (Adult) 1;2    OT Goal 1   Goal Identifier 1 CPT   Goal Description Pt will verbalize understanding of results of cognitive assessment and functional implications for support and safety prn.    Target Date 04/27/21   Date Met 04/27/21    OT Goal 2   Goal Identifier 2 scheduling   Goal Description Pt will develop and follow a daily schedule to ensure safety with taking medications as prescribed, feeding animals, eating 3 meals/day and attending appointments as they arise.    Target Date 06/26/21   Clinical Impression   Criteria for Skilled Therapeutic Interventions Met Yes, treatment indicated   OT Diagnosis decreased IADL perfomrance   Influenced by the following impairments decreased memory, decreased  cognition, decreased safety/judgement   Assessment of Occupational Performance 1-3 Performance Deficits   Identified Performance Deficits scored 4.0/5.5 on CPT   Clinical Decision Making (Complexity) Low complexity   Therapy Frequency 4 more appointments in 60 days   Predicted Duration of Therapy Intervention (days/wks) 60 days   Risks and Benefits of Treatment have been explained. Yes   Patient, Family & other staff in agreement with plan of care Yes   Clinical Impression Comments Pt scored 4.0/5.5 on CPT demonstrating cognitive deficits and decreased safety/judgement negatively affecting IADL performance and safety.  (See OT Progress Note for details on CPT) Pt will benefit from continued skilled OT intervention to develop a daily routine and make sure pt is following it for increased safety and IND with IADLs.    Therapy Certification   Certification date from 04/27/21   Certification date to 06/26/21   Total Evaluation Time   OT Eval, Low Complexity Minutes (54113) 10

## 2021-04-27 NOTE — PROGRESS NOTES
Rehabilitation Services          OUTPATIENT OCCUPATIONAL THERAPY  EVALUATION  PLAN OF TREATMENT FOR OUTPATIENT REHABILITATION  (COMPLETE FOR INITIAL CLAIMS ONLY)  Patient's Last Name, First Name, M.I.  YOB: 1960  Yamileth Doe                        Provider's Name  Aixa Wong OT Medical Record No.  [id number]                               Onset Date:         Start of Care Date:     04/27/21   Type:     ___PT   _X_OT   ___SLP Medical Diagnosis:     Memory Impairment                          OT Diagnosis:     decreased IADL perfomrance Visits from SOC:  1   _________________________________________________________________________________  Plan of Treatment/Functional Goals:  ADL training, IADL training, Cognitive performance testing, Self care/Home management, Therapeutic activities                    Goals  Goal Identifier: 1 CPT  Goal Description: Pt will verbalize understanding of results of cognitive assessment and functional implications for support and safety prn.   Target Date: 04/27/21  Date Met: 04/27/21  Goal Identifier: 2 scheduling  Goal Description: Pt will develop and follow a daily schedule to ensure safety with taking medications as prescribed, feeding animals, eating 3 meals/day and attending appointments as they arise.   Target Date: 06/26/21                                                                                  Therapy Frequency: 4 more appointments in 60 days     Predicted Duration of Therapy Intervention (days/wks): 60 days  Aixa Wong OT          I CERTIFY THE NEED FOR THESE SERVICES FURNISHED UNDER        THIS PLAN OF TREATMENT AND WHILE UNDER MY CARE .             Physician Signature               Date    X_____________________________________________________                       ,    Certification date from: 04/27/21, Certification date to: 06/26/21                Referring Physician: Janell Arellano MD     Initial Assessment        See Epic Evaluation      Start Of Care Date: 04/27/21

## 2021-04-27 NOTE — PROGRESS NOTES
Cognitive Performance Test    SUMMARY OF TEST:    The Cognitive Performance Test (CPT) is a standardized performance-based assessment to measure working memory/executive function processing capacities that underlie functional performance. Subtasks include common basic and instrumental activities of daily living (ADL/IADL) which are rated based on the manner in which patients respond to task demands of varying complexity. The total CPT score describes a level of functioning that indicates how information is processed, implications for functional activities, potential safety risks and a recommended level of supervision or assist based on cognitive function. The highest total score on this test is in the range of 5.6 to 5.8.    DATE OF TESTIN2021    RESULTS OF TESTING:                                                                                         CPT Subtest Results    MEDBOX: 4.0/6 SHOP/GLOVES: 4.0/6 PHONE: NT/6   WASH:  4.5/5 TRAVEL: 5.0/6 TOAST: 5.0/5   DRESS: 4.0/5   TOTAL CPT SCORE:  26.5/33     Average CPT Score  4.4/5.5  However, scoring of this assessment is 'rounded down'. Pt scored 4.0/5.5    INTERPRETATION OF TEST RESULTS:    Based on the Cognitive Performance Test, this patient scored at CPT Level 4.0.  See CPT Levels reference below.    Summary of functional cognitive status:   Medbox:   Pt attempted all 4 pill bottles.  Initially, Fluidia incorrect.  Unable to correct with general or specific cues.    Shop: Pt was accurate in counting change.  Pt needed verbal cue to check bottom gloves due to insufficient funds for the top pair.  Required VC to pay writer after exchange of gloves was completed.  Difficulty keeping the sizes of gloves and prices organized.    Wash:  Need verbal cues to go to the sink and actually wash hands.   Toast:  Completed task accurately.   Dress:  Selected the correct coat but did not select umbrella or head wear when directed back to the closet.   Travel:  Could  not initiate the task with the map.  Needed the written directions exchanged and then able to locate the stairs.     Factors affecting performance:  No additional problems noted    Recommendations:    Recommend 24 hour supervision and that pt does not drive.  Recommend assistance with medication set up, ensuring patient is eating 3, nutrient dense meals per day and assistance with finances.                                                        TIME ADMINISTERING TEST: 45 minutes    TIME FOR INTERPRETATION AND PREPARATION OF REPORT: 5 minutes    TOTAL TIME: 50 minutes      CPT Levels Reference:    Patient's Average CPT Score:  4.0                                                                                                                                                  Individual scores range along a continuum as outlined below.  In addition to cognitive status, other factors may affect safety in a home environment.  Please refer to specific recommendations for this patient.    ___5.6-5.8  Normal functioning (absence of cognitive-functional disability).  Independent in managing personal affairs, monitors and directs own behavior.  Uses complex information to carry out daily activities with safety and accuracy.    Proficient with instrumental activities of daily living (IADL) and learning new activity.  Problems are anticipated, errors are avoided, and consequences of actions are considered.      ___5.0   Mild cognitive-functional disability; deficits in working memory and executive thought processes. Difficulty using complex information. Problems may be observed with recent memory, judgment, reasoning and planning ahead. May be impulsive or have difficulty anticipating consequences.  Safety:  May require assistance to plan ahead; or to manage complex medication schedules, appointments or finances.  Hazardous activities may need to be monitored or limited.  ADL:  Mild functional decline.  Able to complete basic  "self-care and routine household tasks.  May have difficulty with complex daily tasks such as reading, writing, meal preparation, shopping or driving.   Learns through hands on teaching. Self-centered behavior or difficulty considering the needs of others may be seen related to trouble seeing the  whole picture\". Can appear disorganized or uninhibited.    ___4.5  Mild to moderate cognitive-functional disability. Significant deficits in working memory and executive thought processes. Judgment, reasoning and planning show obvious impairment.  Distractible with inability to shift attention/actions given competing stimuli.  Difficulty with problem solving and managing details. Complex daily tasks performed with inconsistency, difficulty, or error.     Safety:  Medications should be monitored, stove use may require supervision, and driving ability may be affected.  Impaired safety awareness with inability to anticipate potential problems.  May not recognize or respond to emergent situations. Requires frequent check-in support.   ADL:  Mild difficulty with simple everyday self-care tasks. Benefits from structured, routine activity.  Will likely need reminders to complete tasks outside of the routine. Requires assistance with planning and IADL tasks like shopping and finances. Learns concrete tasks through repetition, but performance may not generalize. Tends to be impulsive with poor insight. Self centered behavior or inability to consider the needs of others is common.    __X_4.0  Moderate cognitive-functional disability; abstract to concrete thought processes. Working memory and executive function impairments are obvious. Difficulty with planning and problem solving.  Behavior is goal-directed, but unable to follow multi-step directions, is easily distracted, and may not recognize mistakes.  Inability to anticipate hazards or understand precautions.  Safety:  Recommend 24-hour supervision for safety. Supervision needed " for medication management and for hazardous activities. May not be able to follow a restricted diet. Can get lost in unfamiliar surroundings. Generally, persons functioning at level 4 should not be driving.   ADL:  Some decline in quality or frequency of ADL.  Howard enhanced by use of a routine, simple concrete directions, and caregiver set-up of needed items. Complex tasks such as money or home management typically requires assistance.  Relies heavily on vision to guide behavior; will ignore objects/hazards not in plain sight and can be distracted by irrelevant objects. Often has poor insight.  Able to carry out social conversation and may verbally  cover  for deficits leading caregivers to believe they are capable of functioning independently.       ___3.5  Moderate cognitive-functional disability; increased cues needed for task completion. Aware of concrete task steps but needs prompting or cues to initiate and complete simple tasks. Attention span is limited, simple directions may need to be repeated, and re-focus to a topic or task may be required.  Safety:  24-hour supervision required for safety and for assistance with daily tasks. Assistance required with medications, and access to medication should be limited. Meals, nutrition and dietary restrictions need to be monitored.  All hazardous activities should be restricted or supervised. Should not drive. Prone to wandering and can become lost.  ADL:  Moderate functional decline. Familiar tasks usually requires set-up of supplies and directions to complete steps. May need objects handed to them for task initiation. Function best with a set schedule in familiar surroundings with familiar people. All complex tasks must be done by others. Vocabulary is diminished and speech often unfocused.

## 2021-04-28 ENCOUNTER — PATIENT OUTREACH (OUTPATIENT)
Dept: CARE COORDINATION | Facility: CLINIC | Age: 61
End: 2021-04-28

## 2021-04-28 ENCOUNTER — HOSPITAL ENCOUNTER (OUTPATIENT)
Dept: SPEECH THERAPY | Facility: CLINIC | Age: 61
Setting detail: THERAPIES SERIES
End: 2021-04-28
Attending: FAMILY MEDICINE
Payer: COMMERCIAL

## 2021-04-28 PROCEDURE — 97130 THER IVNTJ EA ADDL 15 MIN: CPT | Mod: GN | Performed by: SPEECH-LANGUAGE PATHOLOGIST

## 2021-04-28 PROCEDURE — 97129 THER IVNTJ 1ST 15 MIN: CPT | Mod: GN | Performed by: SPEECH-LANGUAGE PATHOLOGIST

## 2021-04-28 NOTE — PROGRESS NOTES
Social Work Note  [facility]     Patient Name:  Yamileth Deo  /Age:  1960 (60 year old)    Referral Source: Rehab  Reason for Referral:  Support and transportation     met with Patient in person today,  following a rehab appointment.  Sw was contacted to assist with possible sources and support.      Write provide patient with transportation via her MA.  Informed her that she needs to call 3 days for scheduling and this would provide her with free transportation to and from all medical appointments.      Encouraged patient to schedule with her PCP, Dr Arellano for annual exam and to express concerns for memory changes.     Also provided Yamileth with contact information for [facility] - [phone].  Writer also called [facility] intake .  Writer was informed that patient has an appointment from [place] at her home May 17th @ 11 am, by Gabriel Block - [phone].  Evidently, patient had just called his past Monday,  and requested services herself.      Provided patient with writer contact information and encouraged her to call with any additional concerns or questions.  Sw will continue to assist as needed.         CARISSA Frias, [facility]    [facility]  [phone]  [email or web address]

## 2021-04-30 ENCOUNTER — HOSPITAL ENCOUNTER (OUTPATIENT)
Dept: SPEECH THERAPY | Facility: CLINIC | Age: 61
Setting detail: THERAPIES SERIES
End: 2021-04-30
Attending: FAMILY MEDICINE
Payer: COMMERCIAL

## 2021-04-30 PROCEDURE — 97130 THER IVNTJ EA ADDL 15 MIN: CPT | Mod: GN | Performed by: SPEECH-LANGUAGE PATHOLOGIST

## 2021-04-30 PROCEDURE — 97129 THER IVNTJ 1ST 15 MIN: CPT | Mod: GN | Performed by: SPEECH-LANGUAGE PATHOLOGIST

## 2021-05-03 ENCOUNTER — HOSPITAL ENCOUNTER (OUTPATIENT)
Dept: SPEECH THERAPY | Facility: CLINIC | Age: 61
Setting detail: THERAPIES SERIES
End: 2021-05-03
Attending: FAMILY MEDICINE
Payer: COMMERCIAL

## 2021-05-03 PROCEDURE — 97130 THER IVNTJ EA ADDL 15 MIN: CPT | Mod: GN | Performed by: SPEECH-LANGUAGE PATHOLOGIST

## 2021-05-03 PROCEDURE — 97129 THER IVNTJ 1ST 15 MIN: CPT | Mod: GN | Performed by: SPEECH-LANGUAGE PATHOLOGIST

## 2021-05-03 NOTE — PROGRESS NOTES
Outpatient Speech Language Pathology Progress Note     Patient: Yamileth Doe  : 1960    Beginning/End Dates of Reporting Period:  3/26/2021 to 5/3/2021    Referring Provider: DEPE Reis    Therapy Diagnosis: Memory Impairment     Client Self Report: Yamileth arrived on time for today's session. She drove herself here. She was provided information about in home therapy. Yamileth expressed concern about at home therapy because she was worried it would take a while to clean her house. She also expressed concern about her dog having visitors over. Yamileth was provided the phone number and given instructions about how to schedule rides to get to therapy. She reports that she didn't call to ask for a ride to today's appointment because she was given information on Friday and because the ride service requires 3 days notice, she did not have enough notice to request a ride.      Goals:  Goal Identifier 1. immediate memory   Goal Description Ms. Doe will learn and utilize 3 memory strategies to complete immediate memory tasks (recall of list of words, numbers, ect) with 80% accuracy, given min-mod cues in order to be able to participate in her ADLs at home   Target Date 21 Updated Date: 21   Date Met      Progress: Goal not met. Yamileth is in the process of learning 3 memory strategies to complete immediate memory tasks: drawing recall, reminder word, and category label. Yamileth is 60% accurate when using drawn information (that she draws) to recall information after 5 minutes. When using reminder word technique, she is able to recall three words at a time with 85% accuracy. She is able to recall 4 words at a time with 70% accuracy. When using category labels to recall words, she is 40% accurate independently, and 60% accurate with moderate to maximum cues. This goal remains appropriate and will continue to be targeted in order to be able to participate in ADLs at home.      Goal Identifier 2.  Memory   Goal Description Ms. Doe will demonstrate 80% accuracy for auditory comprehension tasks (i.e. following 1-2 step directions, listening to a paragraph and answering questions) after a brief 1-3 minute delay provided min/mod cues in order to respond appropriately when asked a question    Target Date 06/23/21 Updated Date: 08/03/21   Date Met      Progress:Goal not met. After listening to a 1-2 sentence story, Yamileth is able to recall less than 20% of the information independently. Her accuracy increases to 50% with max verbal cues.She is able to follow 1 step (written) directions with 70% accuracy and benefits from reading the direction multiple times. She has most difficulty with prepositional phrases. After reading 1 sentence directions, Yamileth is able to answer questions about what she hears with 30% accuracy independently. With mod/max verbal and visual cues, her accuracy increases to 70%. Goal will continue to be targeted for improved responses when asked a question.      Goal Identifier 3. Reading   Goal Description Ms. Doe will complete a basic level reading task with 80% accuracy (i.e. reading 1 step direction) provided min/mod cues in order to understand written directions   Target Date 06/23/21 Updated Date: 08/03/21   Date Met      Progress: Goal not met. When presented with the task of reading 1-2 sentences containing 3 pieces of information that she needs to remember; she recalls 1 out of 3 pieces in 100% of the sentences, and 2 out of 3 in 40% of the sentences with max verbal cues. Despite max verbal cues, she is typically unable to recall 3/3 pieces of information in any of the sentences. Due to memory and attention difficulties, Yamileth has difficulty holding information that she reads. Goal will continue to be targeted in order to better understand written directions.      Goal Identifier 4. attention   Goal Description Ms. Doe will complete a simple alternating attention task with no  distracter present with 80% accuracy when given min/mod cues in order to manage daily activities   Target Date 06/23/21 Updated Date: 08/03/21   Date Met      Progress:Goal not met. When Yamileth is asked to scan a series of letters marking a specific target, she is able to complete the activity without distractions with 90% accuracy; however, when asked to alternate between 2 different targets her accuracy drops to 50%. When asked to tap her finger every time she hears the number 2 during a series of numbers, Yamileth is 80-90% accurate. When asked to complete a more complex task, her accuracy drops to 80%. This goal will continue to be targeted in order to manage daily activities.      Goal Identifier 5. Home program   Goal Description Ms. Doe will demonstrate completion of home program activities 4/5 opportunities without cues in order to improve her ability to complete ADLs.   Target Date 06/23/21Updated Date: 08/03/21   Date Met      Progress: Goal not met. Yamileth has completed home programming activities in less than 20% of opportunities. When she has completed activities, she returns to session with many questions and has difficulty understanding the task presented. When asked if she completed home programming, Yamileth typically responds with confusion about the questions and says that she must have lost the papers somewhere. Goal will continue to be targeted to improve her ability to complete ADLs.      Plan:  Continue therapy per current plan of care.    Discharge:  KAYA Ornelas    Supervised by:   Cheri Mantilla MA, CCC-SLP  [facility]  [phone] (Phone)  [phone] (Fax)

## 2021-05-11 ENCOUNTER — PATIENT OUTREACH (OUTPATIENT)
Dept: CARE COORDINATION | Facility: CLINIC | Age: 61
End: 2021-05-11

## 2021-05-11 NOTE — PROGRESS NOTES
Social Work Telephone Message Note  [facility]     Patient Name:  Yamileth Doe  /Age:  1960 (60 year old)    Referral Source: self  Reason for Referral:  transportation     received a call from Patient via telephone on 21.  Patient left message requesting assistance with a medical ride to her appointments.  Writer had spoken to this patient previously regarding transportation via her medical insurance and provided number at that time.  Writer called patient and left voicemail with number to [facility] -  Call [phone].    Yamileth has memory and cognitive deficits.  Writer will attempt to connect with patient again through day in hopes of connecting with her.  Sw will continue to assist as needed.          CARISSA Frias, [facility]    [facility]  [phone]  [email or web address]

## 2021-05-12 ENCOUNTER — HOSPITAL ENCOUNTER (OUTPATIENT)
Dept: SPEECH THERAPY | Facility: CLINIC | Age: 61
Setting detail: THERAPIES SERIES
End: 2021-05-12
Attending: FAMILY MEDICINE
Payer: COMMERCIAL

## 2021-05-12 PROCEDURE — 97129 THER IVNTJ 1ST 15 MIN: CPT | Mod: GN | Performed by: SPEECH-LANGUAGE PATHOLOGIST

## 2021-05-12 PROCEDURE — 97130 THER IVNTJ EA ADDL 15 MIN: CPT | Mod: GN | Performed by: SPEECH-LANGUAGE PATHOLOGIST

## 2021-05-19 ENCOUNTER — HOSPITAL ENCOUNTER (OUTPATIENT)
Dept: SPEECH THERAPY | Facility: CLINIC | Age: 61
Setting detail: THERAPIES SERIES
End: 2021-05-19
Attending: FAMILY MEDICINE
Payer: COMMERCIAL

## 2021-05-19 PROCEDURE — 97130 THER IVNTJ EA ADDL 15 MIN: CPT | Mod: GN | Performed by: SPEECH-LANGUAGE PATHOLOGIST

## 2021-05-19 PROCEDURE — 97129 THER IVNTJ 1ST 15 MIN: CPT | Mod: GN | Performed by: SPEECH-LANGUAGE PATHOLOGIST

## 2021-05-24 ENCOUNTER — HOSPITAL ENCOUNTER (OUTPATIENT)
Dept: SPEECH THERAPY | Facility: CLINIC | Age: 61
Setting detail: THERAPIES SERIES
End: 2021-05-24
Attending: FAMILY MEDICINE
Payer: COMMERCIAL

## 2021-05-24 PROCEDURE — 97130 THER IVNTJ EA ADDL 15 MIN: CPT | Mod: GN | Performed by: SPEECH-LANGUAGE PATHOLOGIST

## 2021-05-24 PROCEDURE — 97129 THER IVNTJ 1ST 15 MIN: CPT | Mod: GN | Performed by: SPEECH-LANGUAGE PATHOLOGIST

## 2021-05-24 NOTE — PROGRESS NOTES
Outpatient Speech Language Pathology Discharge Note     Patient: Yamileth Doe  : 1960    Beginning/End Dates of Reporting Period:  5/3/2021 to 2021    Referring Provider: Janell Arellano MD (PCP)    Therapy Diagnosis: Cognitive Impairment.    Client Self Report: Yamileth arrived using a ride through her medical insurance which she required help to set up at her last appointment. She was previously provided the phone number for medical rides, but did not use them and continued to drive herself despite recommendations not to drive based on CPT report indicating she is not safe to drive.    Goals:  Goal Identifier 1. immediate memory   Goal Description Ms. Doe will learn and utilize 3 memory strategies to complete immediate memory tasks (recall of list of words, numbers, ect) with 80% accuracy, given min-mod cues in order to be able to participate in her ADLs at home   Target Date 21   Date Met      Progress: Yamileth continues to demonstrate deficits to immediate memory. She shows attempts to use strategies such as writing notes, however these are disorganized and often end up confusing her. Yamileth is no longer able to self manage her memory impairment and it is recommended she have outside help/management.     Goal Identifier 2. Memory   Goal Description Ms. Doe will demonstrate 80% accuracy for auditory comprehension tasks (i.e. following 1-2 step directions, listening to a paragraph and answering questions) after a brief 1-3 minute delay provided min/mod cues in order to respond appropriately when asked a question    Target Date 21   Date Met      Progress: Same as goal 1 notes. Deficits persist and Yamileth is not believed to be able to manage functional cognitive tasks including medication management, finances and home management. She would benefit from outside assistance.     Goal Identifier 3. Reading   Goal Description Ms. Doe will complete a basic level reading task with 80%  accuracy (i.e. reading 1 step direction) provided min/mod cues in order to understand written directions   Target Date 08/03/21   Date Met      Progress: Yamileth appears to be able to read material as when she reads out loud she is able to demonstrate understanding of material. However, she does show notable difficulty with recall of this information. So, if a pause occurs after reading, she is likely having difficulty with memory, more than reading comprehension.     Goal Identifier 4. attention   Goal Description Ms. Doe will complete a simple alternating attention task with no distracter present with 80% accuracy when given min/mod cues in order to manage daily activities   Target Date 08/03/21   Date Met      Progress: Yamileth struggles with attention and often interrupts herself or loses her train of thought when distractions in the room are present or her own thoughts distract her.     Goal Identifier 5. Home program   Goal Description Ms. Doe will demonstrate completion of home program activities 4/5 opportunities without cues in order to improve her ability to complete ADLs. 4. Completing an eazy maze while talking about her pets 1/4    Target Date 08/03/21   Date Met      Progress: Yamileth showed limited home program completion, which appears to be due to memory limitations.     Plan:  Discharge from therapy. Outpatient speech therapy is not believed to be able to benefit Yamileth at this time. She would benefit more from home therapies including a home safety evaluation based on patient report of ongoing falls. Outpatient therapy targeting cognitive skills is not showing signs of improvements and focus should turn to setting Yamileth up with services and people who can support her as she is not able to do this independently.    Discharge:    Reason for Discharge: No further expectation of progress.    Equipment Issued: Free 4 wheeled walker and shower bench from [email or web address].    Discharge Plan: Yamileth is  scheduled to see her PCP on 5/26. Multiple messages sent re: therapy and safety concerns. PCP has been asked to recommend home care therapies.    Cheri Mantilla MA, [facility]-SLP  [facility]  [phone] (Phone)  [phone] (Fax)

## 2021-09-07 ENCOUNTER — APPOINTMENT (OUTPATIENT)
Dept: URBAN - METROPOLITAN AREA CLINIC 259 | Age: 61
Setting detail: DERMATOLOGY
End: 2021-09-07

## 2021-09-07 DIAGNOSIS — D69.2 OTHER NONTHROMBOCYTOPENIC PURPURA: ICD-10-CM

## 2021-09-07 PROCEDURE — 99202 OFFICE O/P NEW SF 15 MIN: CPT

## 2021-09-07 PROCEDURE — OTHER MIPS QUALITY: OTHER

## 2021-09-07 PROCEDURE — OTHER COUNSELING: OTHER

## 2021-09-07 ASSESSMENT — LOCATION ZONE DERM: LOCATION ZONE: ARM

## 2021-09-07 ASSESSMENT — LOCATION SIMPLE DESCRIPTION DERM
LOCATION SIMPLE: LEFT FOREARM
LOCATION SIMPLE: RIGHT FOREARM

## 2021-09-07 ASSESSMENT — LOCATION DETAILED DESCRIPTION DERM
LOCATION DETAILED: LEFT PROXIMAL DORSAL FOREARM
LOCATION DETAILED: RIGHT PROXIMAL DORSAL FOREARM

## 2021-09-07 NOTE — PROCEDURE: COUNSELING
Patient Specific Counseling (Will Not Stick From Patient To Patient): Recommend Dermend 1-2 times daily
Detail Level: Simple

## 2021-09-07 NOTE — PROCEDURE: MIPS QUALITY
Quality 431: Preventive Care And Screening: Unhealthy Alcohol Use - Screening: Patient not identified as an unhealthy alcohol user when screened for unhealthy alcohol use using a systematic screening method
Quality 110: Preventive Care And Screening: Influenza Immunization: Influenza Immunization Ordered or Recommended, but not Administered due to system reason
Detail Level: Detailed
Quality 226: Preventive Care And Screening: Tobacco Use: Screening And Cessation Intervention: Patient screened for tobacco use and is an ex/non-smoker
Quality 130: Documentation Of Current Medications In The Medical Record: Current Medications Documented

## 2022-09-21 ENCOUNTER — OFFICE VISIT (OUTPATIENT)
Dept: OPTOMETRY | Facility: CLINIC | Age: 62
End: 2022-09-21
Payer: COMMERCIAL

## 2022-09-21 DIAGNOSIS — H25.13 AGE-RELATED NUCLEAR CATARACT OF BOTH EYES: ICD-10-CM

## 2022-09-21 DIAGNOSIS — H52.03 HYPEROPIA, BILATERAL: Primary | ICD-10-CM

## 2022-09-21 DIAGNOSIS — H52.4 PRESBYOPIA: ICD-10-CM

## 2022-09-21 DIAGNOSIS — H52.223 REGULAR ASTIGMATISM OF BOTH EYES: ICD-10-CM

## 2022-09-21 PROCEDURE — 92004 COMPRE OPH EXAM NEW PT 1/>: CPT | Performed by: OPTOMETRIST

## 2022-09-21 PROCEDURE — 92015 DETERMINE REFRACTIVE STATE: CPT | Performed by: OPTOMETRIST

## 2022-09-21 RX ORDER — METHYLPHENIDATE HYDROCHLORIDE 10 MG/1
10 TABLET ORAL
COMMUNITY

## 2022-09-21 RX ORDER — CLONAZEPAM 0.5 MG/1
1 TABLET ORAL AT BEDTIME
COMMUNITY

## 2022-09-21 RX ORDER — TRAZODONE HYDROCHLORIDE 100 MG/1
200 TABLET ORAL AT BEDTIME
COMMUNITY
Start: 2022-09-06

## 2022-09-21 RX ORDER — ASPIRIN 81 MG/1
TABLET, COATED ORAL
COMMUNITY
Start: 2022-09-06

## 2022-09-21 RX ORDER — BUPROPION HYDROCHLORIDE 300 MG/1
300 TABLET ORAL
COMMUNITY

## 2022-09-21 RX ORDER — ESCITALOPRAM OXALATE 20 MG/1
20 TABLET ORAL DAILY
COMMUNITY
Start: 2022-08-14

## 2022-09-21 RX ORDER — ATORVASTATIN CALCIUM 40 MG/1
TABLET, FILM COATED ORAL
COMMUNITY
Start: 2022-09-06

## 2022-09-21 RX ORDER — QUETIAPINE FUMARATE 400 MG/1
400 TABLET, FILM COATED ORAL
COMMUNITY

## 2022-09-21 ASSESSMENT — KERATOMETRY
OD_K2POWER_DIOPTERS: 43.50
OS_AXISANGLE_DEGREES: 72
OD_AXISANGLE2_DEGREES: 30
OD_AXISANGLE_DEGREES: 120
OS_AXISANGLE2_DEGREES: 162
OD_K1POWER_DIOPTERS: 43.00
OS_K1POWER_DIOPTERS: 42.50
OS_K2POWER_DIOPTERS: 42.75

## 2022-09-21 ASSESSMENT — VISUAL ACUITY
OS_CC: 20/40
OD_CC: 20/25
CORRECTION_TYPE: GLASSES
METHOD: SNELLEN - LINEAR
OD_CC: 20/20
OS_CC+: -1
OD_CC+: -1
OS_CC: 20/40

## 2022-09-21 ASSESSMENT — REFRACTION_MANIFEST
METHOD_AUTOREFRACTION: 1
OS_AXIS: 010
OD_SPHERE: +1.25
OD_AXIS: 150
OS_AXIS: 180
OD_CYLINDER: +1.00
OS_SPHERE: +2.50
OD_CYLINDER: +1.50
OD_SPHERE: +1.50
OS_CYLINDER: +0.50
OS_CYLINDER: +0.50
OS_SPHERE: +2.50
OS_ADD: +2.50
OD_AXIS: 160
OD_ADD: +2.50

## 2022-09-21 ASSESSMENT — CUP TO DISC RATIO
OD_RATIO: 0.25
OS_RATIO: 0.25

## 2022-09-21 ASSESSMENT — CONF VISUAL FIELD
OS_NORMAL: 1
OD_NORMAL: 1

## 2022-09-21 ASSESSMENT — SLIT LAMP EXAM - LIDS
COMMENTS: NORMAL
COMMENTS: NORMAL

## 2022-09-21 ASSESSMENT — REFRACTION_WEARINGRX
OD_SPHERE: +1.50
OS_SPHERE: +1.75
OD_AXIS: 160
OS_CYLINDER: +0.75
SPECS_TYPE: BIFOCAL
OD_CYLINDER: +1.00
OS_AXIS: 180
OD_ADD: +2.50
OS_ADD: +2.50

## 2022-09-21 ASSESSMENT — TONOMETRY
OD_IOP_MMHG: 16
IOP_METHOD: APPLANATION
OS_IOP_MMHG: 16

## 2022-09-21 ASSESSMENT — EXTERNAL EXAM - LEFT EYE: OS_EXAM: NORMAL

## 2022-09-21 ASSESSMENT — EXTERNAL EXAM - RIGHT EYE: OD_EXAM: NORMAL

## 2022-09-21 NOTE — PATIENT INSTRUCTIONS
Presbyopia is the diagnosis. Presbyopia is an age-related condition where the eye's crystalline lens doesn't change shape as easily as it once did.    You have the start of mild cataracts.  You may notice some blurred vision or glare with night driving.  It is important that you wear good sunglasses to protect your eyes from the ultraviolet light from the sun.  I recommend that you return in 1 year for an eye exam unless there are any sudden changes in your vision.        Hyperopia is far-sightedness. Hyperopia is commonly rooted from a petite eye length. That results in the light's focus behind the retina.     Astigmatism results from curvature differential in the cornea and crystalline lens which can cause a distorted image, as light rays are prevented from meeting at a common focus.    Eyeglass prescription given.    The affects of the dilating drops last for 4- 6 hours.  You will be more sensitive to light and vision will be blurry up close.  Do not drive if you do not feel comfortable.  Mydriatic sunglasses were given if needed.    Recommend annual eye exams.    Nickie Zazueta O.D.  [address] [address]    [phone]

## 2022-09-21 NOTE — PROGRESS NOTES
Chief Complaint   Patient presents with     Annual Eye Exam         Last Eye Exam: unknown  Dilated Previously: Yes    What are you currently using to see?  glasses       Distance Vision Acuity: Noticed gradual change in both eyes    Near Vision Acuity: Not satisfied     Eye Comfort: good  Do you use eye drops? 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       OBJECTIVE: See Ophthalmology exam    ASSESSMENT:    ICD-10-CM    1. Hyperopia, bilateral - Both Eyes  H52.03 EYE EXAM (SIMPLE-NONBILLABLE)     REFRACTION   2. Regular astigmatism of both eyes - Both Eyes  H52.223 EYE EXAM (SIMPLE-NONBILLABLE)     REFRACTION   3. Presbyopia - Both Eyes  H52.4 EYE EXAM (SIMPLE-NONBILLABLE)     REFRACTION   4. Age-related nuclear cataract of both eyes - Both Eyes  H25.13        PLAN:     Patient Instructions   Presbyopia is the diagnosis. Presbyopia is an age-related condition where the eye's crystalline lens doesn't change shape as easily as it once did.    You have the start of mild cataracts.  You may notice some blurred vision or glare with night driving.  It is important that you wear good sunglasses to protect your eyes from the ultraviolet light from the sun.  I recommend that you return in 1 year for an eye exam unless there are any sudden changes in your vision.        Hyperopia is far-sightedness. Hyperopia is commonly rooted from a petite eye length. That results in the light's focus behind the retina.     Astigmatism results from curvature differential in the cornea and crystalline lens which can cause a distorted image, as light rays are prevented from meeting at a common focus.    Eyeglass prescription given.    The affects of the dilating drops last for 4- 6 hours.  You will be more sensitive to light and vision will be blurry up close.  Do not drive if you do not feel comfortable.  Mydriatic  sunglasses were given if needed.    Recommend annual eye exams.    Nickie Zazueta O.D.  [address] [address] [phone]

## 2022-09-21 NOTE — LETTER
9/21/2022         RE: Yamileth Doe  [address]  [facility] [id number]        Dear Colleague,    Thank you for referring your patient, Yamileth Doe, to the [facility]. Please see a copy of my visit note below.    Chief Complaint   Patient presents with     Annual Eye Exam         Last Eye Exam: unknown  Dilated Previously: Yes    What are you currently using to see?  glasses       Distance Vision Acuity: Noticed gradual change in both eyes    Near Vision Acuity: Not satisfied     Eye Comfort: good  Do you use eye drops? 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       OBJECTIVE: See Ophthalmology exam    ASSESSMENT:    ICD-10-CM    1. Hyperopia, bilateral - Both Eyes  H52.03 EYE EXAM (SIMPLE-NONBILLABLE)     REFRACTION   2. Regular astigmatism of both eyes - Both Eyes  H52.223 EYE EXAM (SIMPLE-NONBILLABLE)     REFRACTION   3. Presbyopia - Both Eyes  H52.4 EYE EXAM (SIMPLE-NONBILLABLE)     REFRACTION   4. Age-related nuclear cataract of both eyes - Both Eyes  H25.13        PLAN:     Patient Instructions   Presbyopia is the diagnosis. Presbyopia is an age-related condition where the eye's crystalline lens doesn't change shape as easily as it once did.    You have the start of mild cataracts.  You may notice some blurred vision or glare with night driving.  It is important that you wear good sunglasses to protect your eyes from the ultraviolet light from the sun.  I recommend that you return in 1 year for an eye exam unless there are any sudden changes in your vision.        Hyperopia is far-sightedness. Hyperopia is commonly rooted from a petite eye length. That results in the light's focus behind the retina.     Astigmatism results from curvature differential in the cornea and crystalline lens which can cause a distorted image, as light rays are prevented  from meeting at a common focus.    Eyeglass prescription given.    The affects of the dilating drops last for 4- 6 hours.  You will be more sensitive to light and vision will be blurry up close.  Do not drive if you do not feel comfortable.  Mydriatic sunglasses were given if needed.    Recommend annual eye exams.    Nickie Zazueta O.D.  [address] [id number] [phone]           Again, thank you for allowing me to participate in the care of your patient.        Sincerely,        Nickie Zazueta, OD

## 2023-10-12 NOTE — TELEPHONE ENCOUNTER
DIAGNOSIS: Osteoarthritis, self referred, no images. Per Ashlie get knee XR   APPOINTMENT DATE: 10/31/23   NOTES STATUS DETAILS   OFFICE NOTE from referring provider N/A Self   MEDICATION LIST Internal

## 2023-10-31 ENCOUNTER — OFFICE VISIT (OUTPATIENT)
Dept: ORTHOPEDICS | Facility: CLINIC | Age: 63
End: 2023-10-31
Payer: MEDICARE

## 2023-10-31 ENCOUNTER — PRE VISIT (OUTPATIENT)
Dept: ORTHOPEDICS | Facility: CLINIC | Age: 63
End: 2023-10-31

## 2023-10-31 ENCOUNTER — ANCILLARY PROCEDURE (OUTPATIENT)
Dept: GENERAL RADIOLOGY | Facility: CLINIC | Age: 63
End: 2023-10-31
Attending: FAMILY MEDICINE
Payer: MEDICARE

## 2023-10-31 DIAGNOSIS — M18.12 LOCALIZED PRIMARY OSTEOARTHRITIS OF CARPOMETACARPAL JOINT OF LEFT THUMB: ICD-10-CM

## 2023-10-31 DIAGNOSIS — M18.12 LOCALIZED PRIMARY OSTEOARTHRITIS OF CARPOMETACARPAL JOINT OF LEFT THUMB: Primary | ICD-10-CM

## 2023-10-31 DIAGNOSIS — M65.332 TRIGGER FINGER, LEFT MIDDLE FINGER: ICD-10-CM

## 2023-10-31 PROCEDURE — 73130 X-RAY EXAM OF HAND: CPT | Mod: LT | Performed by: RADIOLOGY

## 2023-10-31 PROCEDURE — 99204 OFFICE O/P NEW MOD 45 MIN: CPT | Performed by: FAMILY MEDICINE

## 2023-10-31 NOTE — PATIENT INSTRUCTIONS
"TRIGGER FINGER (STENOSING TENOSYNOVITIS)    WHAT IS TRIGGER FINGER?        Trigger finger is a problem with the tendons in your hand that makes it hard for you to straighten 1 or more fingers after you bend them. Tendons are strong bands of tissue that attach muscle to bone. A sheath, or covering, surrounds the tendons that go to your fingers. Tendons usually move easily through the sheaths. Trigger finger is an irritation and thickening of a tendon sheath that traps your tendon or makes it difficult for your tendon to move through the sheath.    Trigger finger is also called stenosing tenosynovitis or digital flexor tenosynovitis.    WHAT IS THE CAUSE?    The exact cause of trigger finger is not known. It may be that trigger finger is caused by overuse of the hand and fingers, such as from work or sports that use your fingers a lot. You may be more likely to get trigger finger if you have diabetes or rheumatoid arthritis.    WHAT ARE THE SYMPTOMS?    Trigger finger can happen in your thumb or any finger, but your middle and ring finger are affected most often. Symptoms may include:    A snap, pop or sudden jerk (\"trigger\" motion) when you try to straighten your finger  Not being able to straighten your finger at all  Pain at the base of your finger or palm, or in your fingers    HOW IS IT DIAGNOSED?    Your healthcare provider will examine you and ask about your symptoms, activities, and medical history. You may have X-rays or other scans.    HOW IS IT TREATED?    Your provider may give your finger a shot of steroid medicine to reduce the irritation and swelling so that the tendon can slide more easily through the sheath. In some cases you may need surgery to remove part of the tendon sheath.    HOW CAN I TAKE CARE OF MYSELF?    To help relieve swelling and pain:    Put an ice pack, gel pack, or package of frozen vegetables wrapped in a cloth, on the area every 3 to 4 hours for up to 20 minutes at a time.  Take " pain medicine, such as acetaminophen, ibuprofen, or other medicine as directed by your provider. Nonsteroidal anti-inflammatory medicines (NSAIDs), such as ibuprofen, may cause stomach bleeding and other problems. These risks increase with age. Read the label and take as directed. Unless recommended by your healthcare provider, do not take for more than 10 days.  Follow your healthcare provider's instructions. Ask your provider:    How and when you will hear your test results  How long it will take to recover  What activities you should avoid, including how much you can lift, and when you can return to your normal activities  How to take care of yourself at home  What symptoms or problems you should watch for and what to do if you have them  Make sure you know when you should come back for a checkup.    HOW CAN I HELP PREVENT TRIGGER FINGER?    Since the cause of trigger finger is not known, there is no sure way to prevent it. Follow safety rules and use any protective equipment recommended for your work or sport. Avoid activities that cause pain.    EXERCISES:    https://www.[employer]/blogs/articles/trigger-finger-exercises    Developed by [employer].  Published by [employer].  Copyright  2014 [employer] and/or one of its subsidiaries. All rights reserved.

## 2023-10-31 NOTE — LETTER
10/31/2023      RE: Yamileth Doe  [address]  [facility] [address]     Dear Colleague,    Thank you for referring your patient, Yamileth Doe, to the [facility] [place]. Please see a copy of my visit note below.    CHIEF COMPLAINT:  Pain of the Left Hand       HISTORY OF PRESENT ILLNESS  Ms. Doe is a pleasant 63 year old year old female who presents to clinic today with left CMC joint OA and middle trigger finger.  Yamileth has been seen for left CMC osteoarthritis at [place] and was scheduled for surgery but due to insurance had to switch health care providers. She has tried conservative treatment including hand therapy, injections, and bracing.    Onset: gradual  Location: Left hand; CMC joint and middle finger   Duration: 3 years- thumb and   Severity: 7-9/10 at worst  Timing:constant  Modifying factors:  resting and non-use makes it better, movement and use makes it worse  Associated signs & symptoms: Triggering   Previous similar pain: Yes, history of carpal tunnel surgery and trigger finger on the right hand   Treatments to date: CSI in the left thumb, hand therapy, and bracing.     Additional history: as documented    Review of Systems:  Have you recently had a a fever, chills, weight loss? No  Do you have any vision problems? No  Do you have any chest pain or edema? No  Do you have any shortness of breath or wheezing?  No  Do you have stomach problems? No  Do you have any numbness or focal weakness? No  Do you have diabetes? No  Do you have problems with bleeding or clotting? No  Do you have an rashes or other skin lesions? No    MEDICAL HISTORY  Patient Active Problem List   Diagnosis   (none) - all problems resolved or deleted       Current Outpatient Medications   Medication Sig Dispense Refill    ASPIRIN LOW DOSE 81 MG EC tablet take 1 tablet (81 mg) by mouth once daily      atorvastatin (LIPITOR) 40 MG tablet take 1/2 tablet by mouth daily at bedtime       buPROPion (WELLBUTRIN XL) 300 MG 24 hr tablet Take 300 mg by mouth      clonazePAM (KLONOPIN) 0.5 MG tablet Take 1 mg by mouth At Bedtime      escitalopram (LEXAPRO) 20 MG tablet Take 20 mg by mouth daily      methylphenidate (RITALIN) 10 MG tablet Take 10 mg by mouth      QUEtiapine (SEROQUEL) 400 MG tablet Take 400 mg by mouth      traZODone (DESYREL) 100 MG tablet Take 200 mg by mouth At Bedtime         Allergies   Allergen Reactions    Latex Itching     Eyes itch    Oxycodone-Acetaminophen Other (See Comments)     Other reaction(s): Other - Describe In Comment Field  Patient states it caused anxiety and for her to feel tense  nervous  Patient states it caused anxiety and for her to feel tense      Soap Dermatitis     Perfumed soaps       Family History   Problem Relation Age of Onset    Hypertension Mother        Additional medical/Social/Surgical histories reviewed in [facility] and updated as appropriate.       PHYSICAL EXAM  There were no vitals taken for this visit.    Musculoskeletal - Left hand   - inspection: no atrophy, normal joint alignment, Nodular appearance of MPJ thumb  - palpation: CMC tenderness at thumb, tenderness of A1 pulley of long finger.  - ROM:  Thumb full ROM, triggering with flexion and extension of long finger.  - strength: 5/5 thumb flexion, 4+ thumb extension, 4/5 thumb abduction with pain, 5/5 thumb adduction.  - special tests:  (-) varus  (-) valgus  (+) CMC grind test  Neuro  - no numbness, no motor deficit, grossly normal coordination, normal muscle tone  Skin  - no ecchymosis, erythema, warmth, or induration, no obvious rash  Psych  - interactive, appropriate, normal mood and affect    IMAGING : XR left hand 3V. Final results and radiologist's interpretation, available in the [facility] health record. Images were reviewed with the patient/family members in the office today. My personal interpretation of the performed imaging is moderately severe CMC osteoarthritis of left thumb.      ASSESSMENT & PLAN  Ms. Doe is a 63 year old year old female who presents to clinic today with acute on chronic left thumb pain relating to known CMC osteoarthritis. Additional triggering of left long finger in setting of history of right trigger finger s/p release.    Diagnosis:   CMC osteoarthritis of left thumb  Trigger finger left long finger    Treatment options for CMC OA were reviewed including OT, Bracing, Oral and topical analgesics, surgery.  Yamileth had very poor response to CSI previously. Will refer to hand therapy to determine CMC OA study candidacy. She is hesitant to pursue surgery just yet.    In regard to trigger finger, start oval-8 splinting at night. Use voltaren gel to CMC and to A1 pulley. Additonal OT protocol if attending for CMC.  Consider CSI if no improvement in 6-8 weeks.     It was a pleasure seeing Yamileth today.    Ashkan Negron DO, CAQSM  Primary Care Sports Medicine

## 2023-10-31 NOTE — PROGRESS NOTES
CHIEF COMPLAINT:  Pain of the Left Hand       HISTORY OF PRESENT ILLNESS  Ms. Doe is a pleasant 63 year old year old female who presents to clinic today with left CMC joint OA and middle trigger finger.  Yamileth has been seen for left CMC osteoarthritis at [place] and was scheduled for surgery but due to insurance had to switch health care providers. She has tried conservative treatment including hand therapy, injections, and bracing.    Onset: gradual  Location: Left hand; CMC joint and middle finger   Duration: 3 years- thumb and   Severity: 7-9/10 at worst  Timing:constant  Modifying factors:  resting and non-use makes it better, movement and use makes it worse  Associated signs & symptoms: Triggering   Previous similar pain: Yes, history of carpal tunnel surgery and trigger finger on the right hand   Treatments to date: CSI in the left thumb, hand therapy, and bracing.     Additional history: as documented    Review of Systems:  Have you recently had a a fever, chills, weight loss? No  Do you have any vision problems? No  Do you have any chest pain or edema? No  Do you have any shortness of breath or wheezing?  No  Do you have stomach problems? No  Do you have any numbness or focal weakness? No  Do you have diabetes? No  Do you have problems with bleeding or clotting? No  Do you have an rashes or other skin lesions? No    MEDICAL HISTORY  Patient Active Problem List   Diagnosis   (none) - all problems resolved or deleted       Current Outpatient Medications   Medication Sig Dispense Refill    ASPIRIN LOW DOSE 81 MG EC tablet take 1 tablet (81 mg) by mouth once daily      atorvastatin (LIPITOR) 40 MG tablet take 1/2 tablet by mouth daily at bedtime      buPROPion (WELLBUTRIN XL) 300 MG 24 hr tablet Take 300 mg by mouth      clonazePAM (KLONOPIN) 0.5 MG tablet Take 1 mg by mouth At Bedtime      escitalopram (LEXAPRO) 20 MG tablet Take 20 mg by mouth daily      methylphenidate (RITALIN) 10 MG tablet Take 10 mg  by mouth      QUEtiapine (SEROQUEL) 400 MG tablet Take 400 mg by mouth      traZODone (DESYREL) 100 MG tablet Take 200 mg by mouth At Bedtime         Allergies   Allergen Reactions    Latex Itching     Eyes itch    Oxycodone-Acetaminophen Other (See Comments)     Other reaction(s): Other - Describe In Comment Field  Patient states it caused anxiety and for her to feel tense  nervous  Patient states it caused anxiety and for her to feel tense      Soap Dermatitis     Perfumed soaps       Family History   Problem Relation Age of Onset    Hypertension Mother        Additional medical/Social/Surgical histories reviewed in [facility] and updated as appropriate.       PHYSICAL EXAM  There were no vitals taken for this visit.    Musculoskeletal - Left hand   - inspection: no atrophy, normal joint alignment, Nodular appearance of MPJ thumb  - palpation: CMC tenderness at thumb, tenderness of A1 pulley of long finger.  - ROM:  Thumb full ROM, triggering with flexion and extension of long finger.  - strength: 5/5 thumb flexion, 4+ thumb extension, 4/5 thumb abduction with pain, 5/5 thumb adduction.  - special tests:  (-) varus  (-) valgus  (+) CMC grind test  Neuro  - no numbness, no motor deficit, grossly normal coordination, normal muscle tone  Skin  - no ecchymosis, erythema, warmth, or induration, no obvious rash  Psych  - interactive, appropriate, normal mood and affect    IMAGING : XR left hand 3V. Final results and radiologist's interpretation, available in the [facility] health record. Images were reviewed with the patient/family members in the office today. My personal interpretation of the performed imaging is moderately severe CMC osteoarthritis of left thumb.     ASSESSMENT & PLAN  Ms. Doe is a 63 year old year old female who presents to clinic today with acute on chronic left thumb pain relating to known CMC osteoarthritis. Additional triggering of left long finger in setting of history of right trigger finger s/p  release.    Diagnosis:   CMC osteoarthritis of left thumb  Trigger finger left long finger    Treatment options for CMC OA were reviewed including OT, Bracing, Oral and topical analgesics, surgery.  Yamileth had very poor response to CSI previously. Will refer to hand therapy to determine CMC OA study candidacy. She is hesitant to pursue surgery just yet.    In regard to trigger finger, start oval-8 splinting at night. Use voltaren gel to CMC and to A1 pulley. Additonal OT protocol if attending for CMC.  Consider CSI if no improvement in 6-8 weeks.     It was a pleasure seeing Yamileth today.    Ashkan Negron DO, CAQSM  Primary Care Sports Medicine

## 2023-11-03 ENCOUNTER — THERAPY VISIT (OUTPATIENT)
Dept: OCCUPATIONAL THERAPY | Facility: CLINIC | Age: 63
End: 2023-11-03
Attending: FAMILY MEDICINE
Payer: MEDICARE

## 2023-11-03 DIAGNOSIS — M65.332 TRIGGER FINGER, LEFT MIDDLE FINGER: ICD-10-CM

## 2023-11-03 DIAGNOSIS — M18.12 LOCALIZED PRIMARY OSTEOARTHRITIS OF CARPOMETACARPAL JOINT OF LEFT THUMB: ICD-10-CM

## 2023-11-03 PROCEDURE — 97110 THERAPEUTIC EXERCISES: CPT | Mod: 59

## 2023-11-03 PROCEDURE — 97165 OT EVAL LOW COMPLEX 30 MIN: CPT | Mod: GO

## 2023-11-03 PROCEDURE — 97530 THERAPEUTIC ACTIVITIES: CPT | Mod: GO

## 2023-11-08 ENCOUNTER — THERAPY VISIT (OUTPATIENT)
Dept: OCCUPATIONAL THERAPY | Facility: CLINIC | Age: 63
End: 2023-11-08
Attending: FAMILY MEDICINE
Payer: MEDICARE

## 2023-11-08 DIAGNOSIS — M18.12 LOCALIZED PRIMARY OSTEOARTHRITIS OF CARPOMETACARPAL JOINT OF LEFT THUMB: Primary | ICD-10-CM

## 2023-11-08 DIAGNOSIS — M65.332 TRIGGER FINGER, LEFT MIDDLE FINGER: ICD-10-CM

## 2023-11-08 PROCEDURE — 97110 THERAPEUTIC EXERCISES: CPT | Mod: GO

## 2023-11-22 ENCOUNTER — THERAPY VISIT (OUTPATIENT)
Dept: OCCUPATIONAL THERAPY | Facility: CLINIC | Age: 63
End: 2023-11-22
Payer: MEDICARE

## 2023-11-22 DIAGNOSIS — M65.332 TRIGGER FINGER, LEFT MIDDLE FINGER: ICD-10-CM

## 2023-11-22 DIAGNOSIS — M18.12 LOCALIZED PRIMARY OSTEOARTHRITIS OF CARPOMETACARPAL JOINT OF LEFT THUMB: Primary | ICD-10-CM

## 2023-11-22 PROCEDURE — 97110 THERAPEUTIC EXERCISES: CPT | Mod: GO

## 2023-11-22 PROCEDURE — 97763 ORTHC/PROSTC MGMT SBSQ ENC: CPT | Mod: GO

## 2023-11-26 ENCOUNTER — HEALTH MAINTENANCE LETTER (OUTPATIENT)
Age: 63
End: 2023-11-26

## 2023-12-06 ENCOUNTER — THERAPY VISIT (OUTPATIENT)
Dept: OCCUPATIONAL THERAPY | Facility: CLINIC | Age: 63
End: 2023-12-06
Payer: MEDICARE

## 2023-12-06 DIAGNOSIS — M65.332 TRIGGER FINGER, LEFT MIDDLE FINGER: ICD-10-CM

## 2023-12-06 DIAGNOSIS — M18.12 LOCALIZED PRIMARY OSTEOARTHRITIS OF CARPOMETACARPAL JOINT OF LEFT THUMB: Primary | ICD-10-CM

## 2023-12-06 PROCEDURE — 97110 THERAPEUTIC EXERCISES: CPT | Mod: GO

## 2024-01-10 NOTE — PROGRESS NOTES
DISCHARGE  Reason for Discharge: Patient has failed to schedule further appointments.    Discharge Plan: Patient to continue home program.    Referring Provider:  Ashkan Negron

## 2024-10-10 ENCOUNTER — TRANSCRIBE ORDERS (OUTPATIENT)
Dept: OTHER | Age: 64
End: 2024-10-10

## 2024-10-10 DIAGNOSIS — Z48.89 AFTERCARE FOLLOWING SURGERY: Primary | ICD-10-CM

## 2025-01-04 ENCOUNTER — HEALTH MAINTENANCE LETTER (OUTPATIENT)
Age: 65
End: 2025-01-04

## 2025-02-16 NOTE — PROGRESS NOTES
OCCUPATIONAL THERAPY EVALUATION  Type of Visit: Evaluation    See electronic medical record for Abuse and Falls Screening details.    Subjective      Presenting condition or subjective complaint: Arthritis & trigger finger  Date of onset: 10/31/23 (MD Order Date)    Past Medical History:   Diagnosis Date    Arthritis    No past surgical history on file.    Prior diagnostic imaging/testing results: X-ray     Prior therapy history for the same diagnosis, illness or injury: Yes      Prior Level of Function  Transfers: Independent  Ambulation: Independent  ADL: Independent  IADL: Finances, Housekeeping, Laundry, Meal preparation, Yard work    Living Environment  Social support: Alone   Type of home: House; Basement   Stairs to enter the home: Yes 2     Ramp: No   Stairs inside the home: Yes 12 Is there a railing: Yes   Equipment owned: Bath bench; Walker with wheels     Employment: No On disability  Hobbies/Interests: Rubber stamping, wood burning, writing    Pain assessment: Pain present  See objective evaluation for additional pain details     Objective   ADDITIONAL HISTORY:  Right hand dominant  Patient reports symptoms of pain, stiffness/loss of motion, weakness/loss of strength, and edema  Transportation: not driving   Currently not working due to shoulder injury    Functional Outcome Measure:   Upper Extremity Functional Index Score:  SCORE:   Column Totals: /80: 28   (A lower score indicates greater disability.)    PAIN:  Pain Level at Rest: 3/10  Pain Level with Use: 9/10  Pain Location: L CMC joint & LF A1 pulley  Pain Quality: Aching, Dull, and Sharp  Pain Frequency: intermittent  Pain is Worst: daytime  Pain is Exacerbated By: Bumping the joint, grasping  Pain is Relieved By: Voltaren, OTC medication  Pain Progression: Worsened    EDEMA: Mild     SENSATION: WNL throughout all nerve distributions; per patient report     ROM:   Hand ROM  Left AROM Right AROM    Long MP 0/102 0/94   PIP 0/108 0/97   DIP 0/75  0/64   Total Active Motion 285 255     Thumb ROM  Left AROM Right AROM    MP Joint /45 mild /73   IP Joint  41, + (mild) /75   Radial Abduction 27 35   Palmar Abduction 37 35   Kapandji Opposition Scale (0-10/10) 10, ++ (mod) 10     OBSERVATIONS/APPEARANCE:   Thumb Left   Shoulder deformity present at CMC joint ++ (mod)   Edema over CMC joint ++ (mod)   Noted collapse of MP Joint into hyperextension during pinch ++ (mod)      SPECIAL TESTS:   CMC OA Provocative Tests  Pain Report Left   Crepitus Present 2/10   Thumb Adduction Stress Test 9/10   C Thumb Extension Stress Test 8/10   Finkelstein's Test 5/10   WHAT Test 5/10     Stage of Stenosing Tenosynovitis (SST) Left   Long Finger  3-4   Stage 1:  Normal  Stage 2:  Uneven motion of tendon  Stage 3:  Triggering, clicking, catching  Stage 4:  Locking in extension or flexion; unlocked by active motion  Stage 5:  Locking in extension or flexion; unlocked by passive motion  Stage 6:  Finger locked in extension or flexion    STRENGTH:     Measured in pounds 11/3/2023 11/3/2023    Left Right   Trial 1 25 50   Average 25 50     Lateral Pinch  Measured in pounds 11/3/2023 11/3/2023    Left Right   Trial 1 10 13   Average 10 13     3 Point Pinch  Measured in pounds 11/3/2023 11/3/2023    Left Right   Trial 1 6 10   Average 6 10     PALPATION:   Long Finger Palpation    A1 Pulley 4/10   A3 Pulley/PIP Joint 6/10     Thumb Palpation    Thumb CMC Joint 9/10   Thenar [place] 0/10   Web Space 8/10   1st Dorsal Compartment 4/10   Radial Styloid 2/10   FCR Insertion 3/10     Assessment & Plan   CLINICAL IMPRESSIONS  Medical Diagnosis: Localized primary osteoarthritis of carpometacarpal joint of left thumb & Trigger finger, left middle finger    Treatment Diagnosis: L CMC OA & LF Trigger    Impression/Assessment: Pt is a 63 year old female presenting to Occupational Therapy due to Localized primary osteoarthritis of carpometacarpal joint of left thumb & Trigger finger, left  middle finger.  The following significant findings have been identified: Impaired ROM, Impaired strength, and Pain.  These identified deficits interfere with their ability to perform self care tasks, recreational activities, household chores,  yard work, and meal planning and preparation as compared to previous level of function.   Patient's limitations or Problem List includes: Pain, Decreased ROM/motion, Increased edema, Weakness, Decreased stability, Decreased , Decreased pinch, Tightness in musculature, and Adherence in connective tissue of the left thumb and long finger which interferes with the patient's ability to perform Self Care Tasks (dressing, hygiene/toileting), Sleep Patterns, Recreational Activities, and Household Chores as compared to previous level of function.    Clinical Decision Making (Complexity):  Assessment of Occupational Performance: 5 or more Performance Deficits  Occupational Performance Limitations: dressing, hygiene and grooming, health management and maintenance, home establishment and management, meal preparation and cleanup, shopping, sleep, and leisure activities  Clinical Decision Making (Complexity): Low complexity    PLAN OF CARE  Treatment Interventions:  Modalities:  US and Paraffin  Therapeutic Exercise:  AROM, AAROM, PROM, Tendon Gliding, Blocking, Reverse Blocking, Isotonics, Isometrics, and Stabilization  Manual Techniques:  Joint mobilization, Friction massage, Myofascial release, and Manual edema mobilization  Orthotic Fabrication:  Static, Finger based, Hand based, and Forearm based  Self Care:  Self Care Tasks and Ergonomic Considerations    Long Term Goals   OT Goal 1  Goal Identifier: Gripping  Goal Description: Patient will report less pain and no triggering with gripping onto a utensil to prepare food  Goal Progress: Moderate difficulty with stage 4 triggering  Target Date: 12/15/23      Frequency of Treatment: 1x weekly  Duration of Treatment: 6 weeks      Education Assessment: Learner/Method: Patient;Demonstration;Pictures/Video  Education Comments: PTRX via printout     Risks and benefits of evaluation/treatment have been explained.   Patient/Family/caregiver agrees with Plan of Care.     Evaluation Time:    OT Eval, Low Complexity Minutes (48459): 28  Signing Clinician: MARCOS Lang [facility]                                                                                   OUTPATIENT OCCUPATIONAL THERAPY      PLAN OF TREATMENT FOR OUTPATIENT REHABILITATION   Patient's Last Name, First Name, Yamileth White YOB: 1960   Provider's Name   [facility]   Medical Record No.  [id number]     Onset Date: 10/31/23 (MD Order Date) Start of Care Date: 11/03/23     Medical Diagnosis:  Localized primary osteoarthritis of carpometacarpal joint of left thumb & Trigger finger, left middle finger      OT Treatment Diagnosis:  L CMC OA & LF Trigger Plan of Treatment  Frequency/Duration:1x weekly/6 weeks    Certification date from 11/03/23   To 12/15/23        See note for plan of treatment details and functional goals     Jennifer Bradley OT                         I CERTIFY THE NEED FOR THESE SERVICES FURNISHED UNDER        THIS PLAN OF TREATMENT AND WHILE UNDER MY CARE     (Physician attestation of this document indicates review and certification of the therapy plan).                Referring Provider:  Ashkan Negron      Initial Assessment  See Epic Evaluation- 11/03/23                         No

## 2025-02-27 ENCOUNTER — TRANSCRIBE ORDERS (OUTPATIENT)
Dept: OTHER | Age: 65
End: 2025-02-27

## 2025-02-27 DIAGNOSIS — Z98.890 S/P CARPAL TUNNEL RELEASE: ICD-10-CM

## 2025-02-27 DIAGNOSIS — S69.92XA INJURY OF LEFT THUMB: Primary | ICD-10-CM

## 2025-04-30 ENCOUNTER — THERAPY VISIT (OUTPATIENT)
Dept: OCCUPATIONAL THERAPY | Facility: CLINIC | Age: 65
End: 2025-04-30
Attending: ORTHOPAEDIC SURGERY
Payer: MEDICARE

## 2025-04-30 DIAGNOSIS — S69.92XD INJURY OF LEFT THUMB, SUBSEQUENT ENCOUNTER: Primary | ICD-10-CM

## 2025-04-30 DIAGNOSIS — Z98.890 S/P CARPAL TUNNEL RELEASE: ICD-10-CM

## 2025-04-30 PROBLEM — S69.92XA INJURY OF LEFT THUMB: Status: ACTIVE | Noted: 2025-04-30

## 2025-04-30 PROCEDURE — 97110 THERAPEUTIC EXERCISES: CPT | Mod: GO | Performed by: OCCUPATIONAL THERAPIST

## 2025-04-30 PROCEDURE — 97165 OT EVAL LOW COMPLEX 30 MIN: CPT | Mod: GO | Performed by: OCCUPATIONAL THERAPIST

## 2025-04-30 NOTE — PROGRESS NOTES
"OCCUPATIONAL THERAPY EVALUATION  Type of Visit: Evaluation        Fall Risk Screen:  Have you fallen 2 or more times in the past year?: No  Have you fallen and had an injury in the past year?: No    Subjective   Patient comes to therapy with referral for left thumb pain. She underwent a left thumb arthroplasty, CTR, and trigger finger release in August of 2024. She went through therapy with [facility] but returns to therapy again reporting feeling weak again and \"I want to continue to work on my strength\".  She no longer has triggering or carpal tunnel symptoms. The left thumb is \"much better\" in regards to pain, still some discomfort with activities. She notes \"my wrist just feels sprained all of the time\" as well as difficulty with peeling potatoes and carrying items.          Presenting condition or subjective complaint: hand and wrist therapy after bone removal of bone from arithritis surgery  Date of onset: 08/01/24 (approximate)    Relevant medical history: Arthritis; Bladder or bowel problems; Chest pain; Depression; Overweight   Dates & types of surgery: late july 2024 arthritis surgery  total shoulder replacement august about 20th    Prior diagnostic imaging/testing results: X-ray; Other     Prior therapy history for the same diagnosis, illness or injury: Yes late 2024 September Oct Nov    Prior Level of Function  Transfers: Independent  Ambulation: Independent  ADL: Independent  IADL: Independent    Living Environment  Social support: Alone   Type of home: House   Stairs to enter the home: Yes 2 Is there a railing: Yes     Ramp: No   Stairs inside the home:         Help at home: None  Equipment owned:       Employment: No    Hobbies/Interests: painting   rubber stamps   my dog  wood burning....    Patient goals for therapy: gain strength in left hand find out about new bumps on left palm wrist always feels sprained  and cannot carry anything pain in lower thumb     Objective   ADDITIONAL HISTORY:  Right " hand dominant  Patient reports symptoms of pain and weakness/loss of strength  Transportation: medical transportation    PAIN:  Mild discomfort at rest, increases with heavy activity    EDEMA: None     SCAR/WOUND: all incisions fully healed    SENSATION: WNL throughout all nerve distributions; per patient report     ROM: Not tested    STRENGTH:    4/30/2025    Right 50#   Left 25#     Pinch 4/30/2025    Lateral      Right 10#                    Left 2#   3 Point     Right 9#                   Left 6#       Assessment & Plan   CLINICAL IMPRESSIONS  Medical Diagnosis: Injury of left thumb; S/P carpal tunnel syndrome    Treatment Diagnosis: Left thumb/wrist pain/weakness    Impression/Assessment: Pt is a 64 year old female presenting to Occupational Therapy due to left thumb/wrist pain/weakness.  The following significant findings have been identified: Impaired strength and Pain.  These identified deficits interfere with their ability to perform self care tasks, recreational activities, household chores, and meal planning and preparation as compared to previous level of function.   Patient's limitations or Problem List includes: Pain, Decreased , and Decreased pinch of the left wrist and thumb which interferes with the patient's ability to perform Self Care Tasks (dressing, bathing) and Household Chores as compared to previous level of function.    Clinical Decision Making (Complexity):  Assessment of Occupational Performance: 1-3 Performance Deficits  Occupational Performance Limitations: hygiene and grooming, meal preparation and cleanup, and leisure activities  Clinical Decision Making (Complexity): Low complexity    PLAN OF CARE  Treatment Interventions:  Therapeutic Exercise:  AROM, Isotonics, Isometrics, and Stabilization  Orthotic Fabrication:  Static  Self Care:  Self Care Tasks and Ergonomic Considerations    Long Term Goals   OT Goal 1  Goal Description: Pt will report decreased discomfort with meal  prep activities (cutting vegetables) in 5 visits      Frequency of Treatment: 1x per week then every other week  Duration of Treatment: 5 vists     Recommended Referrals to Other Professionals: None  Education Assessment: Learner/Method: Patient;Listening;Reading;Demonstration;Pictures/Video     Risks and benefits of evaluation/treatment have been explained.   Patient/Family/caregiver agrees with Plan of Care.     Evaluation Time:    OT David Low Complexity Minutes (13078): 15      Signing Clinician: DUSTIN Tate, CHT        [facility]                                                                                   OUTPATIENT OCCUPATIONAL THERAPY      PLAN OF TREATMENT FOR OUTPATIENT REHABILITATION   Patient's Last Name, First Name, Yamileth White YOB: 1960   Provider's Name   [facility]   Medical Record No.  [id number]     Onset Date: 08/01/24 (approximate) Start of Care Date: 04/30/25     Medical Diagnosis:  Injury of left thumb; S/P carpal tunnel syndrome      OT Treatment Diagnosis:  Left thumb/wrist pain/weakness Plan of Treatment  Frequency/Duration:1x per week then every other week/5 vists    Certification date from 04/30/25   To 06/11/25        See note for plan of treatment details and functional goals     DUSTIN Tate, CHT                         I CERTIFY THE NEED FOR THESE SERVICES FURNISHED UNDER        THIS PLAN OF TREATMENT AND WHILE UNDER MY CARE .             Physician Signature               Date    X_____________________________________________________                  Referring Provider:  Murtaza Farley    Initial Assessment  See Epic Evaluation- 04/30/25

## 2025-05-07 ENCOUNTER — THERAPY VISIT (OUTPATIENT)
Dept: OCCUPATIONAL THERAPY | Facility: CLINIC | Age: 65
End: 2025-05-07
Attending: ORTHOPAEDIC SURGERY
Payer: MEDICARE

## 2025-05-07 DIAGNOSIS — S69.92XD INJURY OF LEFT THUMB, SUBSEQUENT ENCOUNTER: Primary | ICD-10-CM

## 2025-05-07 DIAGNOSIS — Z98.890 S/P CARPAL TUNNEL RELEASE: ICD-10-CM

## 2025-05-07 PROCEDURE — 97110 THERAPEUTIC EXERCISES: CPT | Mod: GO | Performed by: OCCUPATIONAL THERAPIST

## 2025-05-07 PROCEDURE — 97035 APP MDLTY 1+ULTRASOUND EA 15: CPT | Mod: GO | Performed by: OCCUPATIONAL THERAPIST

## 2025-05-14 ENCOUNTER — THERAPY VISIT (OUTPATIENT)
Dept: OCCUPATIONAL THERAPY | Facility: CLINIC | Age: 65
End: 2025-05-14
Attending: ORTHOPAEDIC SURGERY
Payer: MEDICARE

## 2025-05-14 DIAGNOSIS — S69.92XD INJURY OF LEFT THUMB, SUBSEQUENT ENCOUNTER: Primary | ICD-10-CM

## 2025-05-14 DIAGNOSIS — Z98.890 S/P CARPAL TUNNEL RELEASE: ICD-10-CM

## 2025-05-14 PROCEDURE — 97110 THERAPEUTIC EXERCISES: CPT | Mod: GO | Performed by: OCCUPATIONAL THERAPIST

## 2025-05-28 ENCOUNTER — THERAPY VISIT (OUTPATIENT)
Dept: OCCUPATIONAL THERAPY | Facility: CLINIC | Age: 65
End: 2025-05-28
Payer: MEDICARE

## 2025-05-28 DIAGNOSIS — S69.92XD INJURY OF LEFT THUMB, SUBSEQUENT ENCOUNTER: Primary | ICD-10-CM

## 2025-05-28 DIAGNOSIS — Z98.890 S/P CARPAL TUNNEL RELEASE: ICD-10-CM

## 2025-05-28 PROCEDURE — 97110 THERAPEUTIC EXERCISES: CPT | Mod: GO | Performed by: OCCUPATIONAL THERAPIST

## 2025-08-25 ENCOUNTER — DOCUMENTATION ONLY (OUTPATIENT)
Dept: OCCUPATIONAL THERAPY | Facility: CLINIC | Age: 65
End: 2025-08-25
Payer: MEDICARE

## 2025-08-25 DIAGNOSIS — Z98.890 S/P CARPAL TUNNEL RELEASE: Primary | ICD-10-CM

## 2025-08-25 PROBLEM — S69.92XA INJURY OF LEFT THUMB: Status: RESOLVED | Noted: 2025-04-30 | Resolved: 2025-08-25
